# Patient Record
Sex: FEMALE | Race: WHITE | NOT HISPANIC OR LATINO | Employment: FULL TIME | URBAN - METROPOLITAN AREA
[De-identification: names, ages, dates, MRNs, and addresses within clinical notes are randomized per-mention and may not be internally consistent; named-entity substitution may affect disease eponyms.]

---

## 2023-11-21 ENCOUNTER — TELEMEDICINE (OUTPATIENT)
Dept: DERMATOLOGY | Facility: CLINIC | Age: 28
End: 2023-11-21
Payer: COMMERCIAL

## 2023-11-21 DIAGNOSIS — L70.0 ACNE VULGARIS: Primary | ICD-10-CM

## 2023-11-21 PROCEDURE — 99204 OFFICE O/P NEW MOD 45 MIN: CPT | Performed by: STUDENT IN AN ORGANIZED HEALTH CARE EDUCATION/TRAINING PROGRAM

## 2023-11-21 RX ORDER — SPIRONOLACTONE 100 MG/1
150 TABLET, FILM COATED ORAL DAILY
COMMUNITY
Start: 2023-06-14 | End: 2023-11-21 | Stop reason: SDUPTHER

## 2023-11-21 RX ORDER — SPIRONOLACTONE 100 MG/1
TABLET, FILM COATED ORAL
Qty: 45 TABLET | Refills: 11 | Status: SHIPPED | OUTPATIENT
Start: 2023-11-21

## 2023-11-21 RX ORDER — TERBINAFINE HYDROCHLORIDE 250 MG/1
250 TABLET ORAL DAILY
COMMUNITY
Start: 2023-10-13

## 2023-11-21 NOTE — PROGRESS NOTES
[facility] Note     Patient Name: Gila Fletcher  Encounter Date: 11/21/2023     Have you been cared for by a [facility]ologist in the last 3 years and, if so, which description applies to you? NO. I am considered a "new" patient and must complete all patient intake questions. I am FEMALE/of child-bearing potential.    REVIEW OF SYSTEMS:  Have you recently had or currently have any of the following? Recent fever or chills? No  Any non-healing wound? No  Are you pregnant or planning to become pregnant? No  Are you currently or planning to be nursing or breast feeding? No   PAST MEDICAL HISTORY:  Have you personally ever had or currently have any of the following? If "YES," then please provide more detail. Skin cancer (such as Melanoma, Basal Cell Carcinoma, Squamous Cell Carcinoma? No  Tuberculosis, HIV/AIDS, Hepatitis B or C: No  Radiation Treatment No   HISTORY OF IMMUNOSUPPRESSION:   Do you have a history of any of the following:  Systemic Immunosuppression such as Diabetes, Biologic or Immunotherapy, Chemotherapy, Organ Transplantation, Bone Marrow Transplantation? No    Answering "YES" requires the addition of the dotphrase "IMMUNOSUPPRESSED" as the first diagnosis of the patient's visit. FAMILY HISTORY:  Any "first degree relatives" (parent, brother, sister, or child) with the following? Skin Cancer, Pancreatic or Other Cancer? No   PATIENT EXPERIENCE:    Do you want the Dermatologist to perform a COMPLETE skin exam today including a clinical examination under the "bra and underwear" areas? NO  If necessary, do we have your permission to call and leave a detailed message on your Preferred Phone number that includes your specific medical information? Yes      Not on File No current outpatient medications on file. Whom besides the patient is providing clinical information about today's encounter?    NO ADDITIONAL HISTORIAN (patient alone provided history)    Physical Exam and Assessment/Plan by Diagnosis:    Virtual Regular Visit    Verification of patient location:    Patient is located at Home in the following state in which I hold an active license [place]      Assessment/Plan:    Problem List Items Addressed This Visit    None           Reason for visit is   Chief Complaint   Patient presents with    Virtual Regular Visit          Encounter provider Siva Diamond MD    Provider located at [address]  [phone]      Recent Visits  No visits were found meeting these conditions. Showing recent visits within past 7 days and meeting all other requirements  Today's Visits  Date Type Provider Dept   11/21/23 Telemedicine Siva Diamond MD  Dermatology [address] [facility]   Showing today's visits and meeting all other requirements  Future Appointments  No visits were found meeting these conditions. Showing future appointments within next 150 days and meeting all other requirements       The patient was identified by name and date of birth. Alfredo Corrales was informed that this is a telemedicine visit and that the visit is being conducted through the [employer]. She agrees to proceed. .  My office door was closed. The patient was notified the following individuals were present in the room [facility], [facility]. She acknowledged consent and understanding of privacy and security of the video platform. The patient has agreed to participate and understands they can discontinue the visit at any time. Patient is aware this is a billable service. Subjective  Alfredo Corrales is a 33 y.o. female for Acne. HPI     No past medical history on file. No past surgical history on file. No current outpatient medications on file. No current facility-administered medications for this visit.         Not on File    Review of Systems    Video Exam    There were no vitals filed for this visit. Physical Exam     Visit Time  Total Visit Duration: 15      ACNE VULGARIS ("COMMON ACNE")    Physical Exam:  Anatomic Location Affected:  Chin, jawline, back  Morphological Description: Clear today. Previously with cystic lesions  Pertinent Positives:  Pertinent Negatives: Additional History of Present Condition:  Present since 7years old on and off worse recently. She had cystic acne on shoulders and back and complained of hurting. Patient was taking Spironolactone 150 mg daily started 1 year ago and ran out of medication in the last week. Has an IUD. History notable for acne that responded well to spironolactone prescribed by outside provider  Denies family history of breast cancer, personal history of low blood pressure, liver disease, kidney disease, hyperkalemia  Educated that unless over age 39 or with a history of renal/liver disease, hyperkalemia, or medication interactions, evidence-based medicine does not support routine lab studies in patients on spironolactone. The potential link to estrogen-sensitive cancers in high dose animal studies such as breast cancer was discussed and the patient was counseled that the most recent meta analyses on spironolactone in humans on typical dosing has not demonstrated this risk. Discussed that treatment is directed at improving skin appearance and reducing the likelihood of scarring. Discussed theraputic ladder including topical OTC treatments, topical prescriptions, and oral medications. Discussed side effects as noted below. Plan today:  Follow up in 1 year  Continue spironolactone 150 mg PO DAILY. Educated to start with 50 mg nightly for two weeks then increase to BID dosing if tolerated. S/E reviewed including menstrual irregularity, breast tenderness, headaches, GI upset, K+ retention, palpitations, teratogenicity/feminization of male fetus. Handout provided.        Call with any questions or concerns before next follow-up visit; do not stop medications abruptly without consulting provider. Call our office at [phone] (SKIN). It is better to be safe than to be sorry!         Scribe Attestation      I,:  Derrell Maciel am acting as a scribe while in the presence of the attending physician.:       I,:  Clau Beasley MD personally performed the services described in this documentation    as scribed in my presence.:

## 2024-04-10 ENCOUNTER — PATIENT MESSAGE (OUTPATIENT)
Dept: DERMATOLOGY | Facility: CLINIC | Age: 29
End: 2024-04-10

## 2024-06-26 ENCOUNTER — OFFICE VISIT (OUTPATIENT)
Dept: DERMATOLOGY | Facility: CLINIC | Age: 29
End: 2024-06-26
Payer: COMMERCIAL

## 2024-06-26 ENCOUNTER — TELEPHONE (OUTPATIENT)
Age: 29
End: 2024-06-26

## 2024-06-26 VITALS — WEIGHT: 165 LBS | HEIGHT: 70 IN | TEMPERATURE: 97.2 F | BODY MASS INDEX: 23.62 KG/M2

## 2024-06-26 DIAGNOSIS — L60.3 ONYCHODYSTROPHY: Primary | ICD-10-CM

## 2024-06-26 PROCEDURE — 99214 OFFICE O/P EST MOD 30 MIN: CPT

## 2024-06-26 RX ORDER — CICLOPIROX 80 MG/ML
SOLUTION TOPICAL
Qty: 6.6 ML | Refills: 3 | Status: SHIPPED | OUTPATIENT
Start: 2024-06-26

## 2024-06-26 RX ORDER — CICLOPIROX OLAMINE 7.7 MG/100ML
SUSPENSION TOPICAL
Qty: 30 ML | Refills: 3 | Status: SHIPPED | OUTPATIENT
Start: 2024-06-26

## 2024-06-26 RX ORDER — PRENATAL VIT 91/IRON/FOLIC/DHA 28-975-200
COMBINATION PACKAGE (EA) ORAL 2 TIMES DAILY
Qty: 42 G | Refills: 5 | Status: CANCELLED | OUTPATIENT
Start: 2024-06-26

## 2024-06-26 NOTE — TELEPHONE ENCOUNTER
Call transferred from [facility] pharmacy calling to relay that potentially incorrect rx sent for ciclopirox. Pharmacist states nail lacquer rx concentration is 8% and bottle is 6.6ml. Noted will route to provider for review. No other questions at this time.

## 2024-06-26 NOTE — PROGRESS NOTES
"[facility] Note     Patient Name: Gloria Marquez  Encounter Date: 06/26/24     Have you been cared for by a [facility] Dermatologist in the last 3 years and, if so, which description applies to you?    Yes.  I have been here within the last 3 years, and my medical history has NOT changed since that time.  I am FEMALE/of child-bearing potential.    REVIEW OF SYSTEMS:  Have you recently had or currently have any of the following? No changes in my recent health.   PAST MEDICAL HISTORY:  Have you personally ever had or currently have any of the following?  If \"YES,\" then please provide more detail. No changes in my medical history.   HISTORY OF IMMUNOSUPPRESSION: Do you have a history of any of the following:  Systemic Immunosuppression such as Diabetes, Biologic or Immunotherapy, Chemotherapy, Organ Transplantation, Bone Marrow Transplantation?  No     Answering \"YES\" requires the addition of the dotphrase \"IMMUNOSUPPRESSED\" as the first diagnosis of the patient's visit.   FAMILY HISTORY:  Any \"first degree relatives\" (parent, brother, sister, or child) with the following?    No changes in my family's known health.   PATIENT EXPERIENCE:    Do you want the Dermatologist to perform a COMPLETE skin exam today including a clinical examination under the \"bra and underwear\" areas?  NO  If necessary, do we have your permission to call and leave a detailed message on your Preferred Phone number that includes your specific medical information?  Yes      No Known Allergies   Current Outpatient Medications:     spironolactone (ALDACTONE) 100 mg tablet, Take 1.5 tablets (150 mg) daily with large glass of water. STOP IMMEDIATELY IF YOU BECOME UNINTENTIONALLY PREGNANT or if you decide to plan for pregnancy. Avoid large amounts of high potassium food or beverages while taking this medication., Disp: 45 tablet, Rfl: 11    terbinafine (LamISIL) 250 mg tablet, 250 mg daily, Disp: , Rfl:           Whom besides the " "patient is providing clinical information about today's encounter?   NO ADDITIONAL HISTORIAN (patient alone provided history)    Physical Exam and Assessment/Plan by Diagnosis:      ONYCHODYSTROPHY-POSSIBLE ONYCHOMYCOSIS VS ONYCHODYSTROPHY SECONDARY TO MECHANICAL ABUTTING TRAUMA WITH TIGHT FITTING SHOES     Physical Exam:  Anatomic Location Affected:  left toe  Morphological Description:  faint yellow discoloration of toenail with horizontal ridges, thickening of toenail noted  Pertinent Positives:  Pertinent Negatives: no scaling in between toes or bottoms of feet     Additional History of Present Condition: Patient presents for an evaluation of toenail fungus. Patient states she has had this for a year. She saw urgent care for this and she was placed on a 3 month course of oral terbinafine. Patient noted improvement in the nail. Normal nail growth was seen. Patient denies any trauma to the nail or use of tight fitting shoes. Per photographs provided by patient, at the time nail appearance appeared consistent with fungal infection as nail was more discolored with a yellow/green color. Patient states the nail started to worsen again after completion of the oral terbinafine. She has been using a Khmer disinfection spray \"Octeni Sept\" and filing down nail.     Assessment and Plan:  Based on a thorough discussion of this condition and the management approach to it (including a comprehensive discussion of the known risks, side effects and potential benefits of treatment), the patient (family) agrees to implement the following specific plan:  Given lack of scaling in between toes or bottoms of feet, onychomycosis is less likely but cannot be sure for certain as toenail is very short and unable to clip a sample for fungal PCR.   Will treat with antifungals for now. Given risks and side effects of oral terbinafine, treatment with topical ciclopirox preferred.   Start ciclopirox solution-apply to affected nail once a day " in the evening. Recommend using alcohol pad every 7 days to remove buildup to allow for better penetration.    Can use topical antifungal as well such as over the counter Lotrimin Ultra twice a day Monday, Wednesday, Friday prevent potential spreading. Advised to apply in between toes and tops and bottoms of feet.   Let toenail grow out in meantime. Will take nail clipping for fungal PCR at follow-up.    Follow up in 3 months      Scribe Attestation      I,:  Radha Harris am acting as a scribe while in the presence of the attending physician.:       I,:  Aye Morgan PA-C personally performed the services described in this documentation    as scribed in my presence.:

## 2024-06-26 NOTE — TELEPHONE ENCOUNTER
Received call from [facility] pharmacy stating that the medication that was ordered is incorrect and needs to be changed.    I warm transferred the call to Reena to further assist her with the change

## 2024-09-18 ENCOUNTER — OFFICE VISIT (OUTPATIENT)
Dept: DERMATOLOGY | Facility: CLINIC | Age: 29
End: 2024-09-18
Payer: COMMERCIAL

## 2024-09-18 VITALS — WEIGHT: 168 LBS | HEIGHT: 70 IN | BODY MASS INDEX: 24.05 KG/M2

## 2024-09-18 DIAGNOSIS — L03.032 PARONYCHIA OF GREAT TOE, LEFT: Primary | ICD-10-CM

## 2024-09-18 DIAGNOSIS — L60.3 ONYCHODYSTROPHY: ICD-10-CM

## 2024-09-18 PROCEDURE — 99214 OFFICE O/P EST MOD 30 MIN: CPT

## 2024-09-18 RX ORDER — MUPIROCIN 20 MG/G
OINTMENT TOPICAL
Qty: 30 G | Refills: 0 | Status: SHIPPED | OUTPATIENT
Start: 2024-09-18

## 2024-09-18 RX ORDER — CEPHALEXIN 500 MG/1
500 CAPSULE ORAL EVERY 12 HOURS SCHEDULED
Qty: 14 CAPSULE | Refills: 0 | Status: SHIPPED | OUTPATIENT
Start: 2024-09-18 | End: 2024-09-25

## 2024-09-18 RX ORDER — CLOBETASOL PROPIONATE 0.5 MG/G
OINTMENT TOPICAL
Qty: 30 G | Refills: 0 | Status: SHIPPED | OUTPATIENT
Start: 2024-09-18

## 2024-09-18 NOTE — PATIENT INSTRUCTIONS
1. The umbilical cord will fall off on its own around 10 days, until then only sponge baths.  2. No water, tea, juice, or solids for 6 months. No honey or cow’s milk for 1 year.  3. Always place your baby on his/her back to sleep (not his/her tummy). Have the baby sleep only in his/her crib or bassinet. Don’t have any extra blankets, pillows, or stuffed animals in the crib with them. Never have the baby sleep in your bed with you!  4. Take to ER immediately with rectal temperature 100.4 F or above. If you don't have a thermometer at home, buy one soon.  5. Use a rear-facing car seat, always in the back seat.  6. When you get frustrated at your baby, never shake him/her. It is okay to place the baby in the crib and let him/her cry for a few minutes while you calm down or get someone else to help. Feeling tired, blue, or overwhelmed in the first weeks is normal. If it continues, resources are available for help.  7. We recommend that you give daily Vitamin D supplement to your baby, since he/she won't get enough from breast milk or formula. There are many brands available over-the- counter. See the label for instructions on how much to give.  8. Use fragrance-free soap/lotion like aveeno or aquafor. Do not use any baby powders. Avoid direct sunlight. Change diapers frequently to avoid diaper rash. Wash your hands often and keep the baby away from people who are sick.  9. Ok to express milk manually or pump to the point of comfort (2-3 min/breast) to help with the engorged feeling. Also, it may help to apply warm compresses before feeding, and cold compresses between feedings.     Assessment and Plan:  Based on a thorough discussion of this condition and the management approach to it (including a comprehensive discussion of the known risks, side effects and potential benefits of treatment), the patient (family) agrees to implement the following specific plan:  Given lack of scaling in between toes or bottoms of feet, onychomycosis is less likely but cannot be sure for certain as toenail is very short and unable to clip a sample for fungal PCR.    Let toenail grow out in meantime.   Start Mupirocin for 10 days to affected left toe  After the ten days start Clobetasol for 2 weeks  Start taking Keflex 500 mg twice a day for 7 days

## 2024-09-18 NOTE — PROGRESS NOTES
"[facility] Note     Patient Name: Gloria Marquez  Encounter Date: 9/18/24     Have you been cared for by a [facility] Dermatologist in the last 3 years and, if so, which description applies to you?    Yes.  I have been here within the last 3 years, and my medical history has NOT changed since that time.  I am FEMALE/of child-bearing potential.    REVIEW OF SYSTEMS:  Have you recently had or currently have any of the following? No changes in my recent health.   PAST MEDICAL HISTORY:  Have you personally ever had or currently have any of the following?  If \"YES,\" then please provide more detail. No changes in my medical history.   HISTORY OF IMMUNOSUPPRESSION: Do you have a history of any of the following:  Systemic Immunosuppression such as Diabetes, Biologic or Immunotherapy, Chemotherapy, Organ Transplantation, Bone Marrow Transplantation or Prednisone?  No     Answering \"YES\" requires the addition of the dotphrase \"IMMUNOSUPPRESSED\" as the first diagnosis of the patient's visit.   FAMILY HISTORY:  Any \"first degree relatives\" (parent, brother, sister, or child) with the following?    No changes in my family's known health.   PATIENT EXPERIENCE:    Do you want the Dermatologist to perform a COMPLETE skin exam today including a clinical examination under the \"bra and underwear\" areas?  NO  If necessary, do we have your permission to call and leave a detailed message on your Preferred Phone number that includes your specific medical information?  Yes      No Known Allergies   Current Outpatient Medications:     ciclopirox (LOPROX) 0.77 % SUSP, Apply to affected toenail once a day in evenings. Remove buildup with alcohol pad every 7 days., Disp: 30 mL, Rfl: 3    ciclopirox (PENLAC) 8 % solution, Apply to affected toenail once a day in evenings. Remove buildup with alcohol pad every 7 days., Disp: 6.6 mL, Rfl: 3    spironolactone (ALDACTONE) 100 mg tablet, Take 1.5 tablets (150 mg) daily with " large glass of water. STOP IMMEDIATELY IF YOU BECOME UNINTENTIONALLY PREGNANT or if you decide to plan for pregnancy. Avoid large amounts of high potassium food or beverages while taking this medication., Disp: 45 tablet, Rfl: 11    terbinafine (LamISIL) 250 mg tablet, 250 mg daily, Disp: , Rfl:           Whom besides the patient is providing clinical information about today's encounter?   NO ADDITIONAL HISTORIAN (patient alone provided history)    Physical Exam and Assessment/Plan by Diagnosis:     ACUTE PARONYCHIA/ONYCHODYSTROPHY   Physical Exam:  Anatomic Location Affected: proximal, lateral and distal nail folds of left big toe  Morphological Description: erythema and edema of nail folds with tenderness on palpation, yellow discoloration of toenail with horizontal ridges, thickening of toenail   Pertinent Negatives: no scaling in between toes or bottoms of feet      Additional History of Present Condition: Patient presents as a follow-up for onychodystrophy that could possibly have been due to onychomycosis versus trauma from tight fitting shoes. Onychomycosis was less likely due to lack of scaling of feet and in between toes, but treated with antifungals in the meantime until nail grew out long enough to clip and send for fungal culture.Nail clipping could not be performed at that time as nail was very short. Patient advised to follow up for nail clipping once nail has grown out. Patient was prescribed topical Ciclopirox to apply to nail in addition to over the counter Lotrimin Ultra to the feet which the patient did not try. Patient noted some improvement but today the toes was inflamed. Patient denies any trauma to the nail or use of tight fitting shoes.      Assessment and Plan:  Based on a thorough discussion of this condition and the management approach to it (including a comprehensive discussion of the known risks, side effects and potential benefits of treatment), the patient (family) agrees to implement  the following specific plan:  Nail remains too short for nail clipping at this time.  Recommend stopping Ciclopirox solution at this time given acute paronychia.   Discuss paronychia is usually bacterial will treat with antibacterial agents.   Start Mupirocin 2% ointment -apply to nail folds around nail twice a day for 10 days.  After the ten days can then start Clobetasol 0.05% cream- apply twice a day to nail folds around nail for 2 weeks only.   Start Keflex 500 mg twice a day for 7 days.  Follow-up: 1 month.    Scribe Attestation      I,:  Beverley Olvera MA am acting as a scribe while in the presence of the attending physician.:       I,:  Aye Morgan PA-C personally performed the services described in this documentation    as scribed in my presence.:

## 2024-10-01 ENCOUNTER — TELEPHONE (OUTPATIENT)
Dept: DERMATOLOGY | Facility: CLINIC | Age: 29
End: 2024-10-01

## 2024-10-01 NOTE — TELEPHONE ENCOUNTER
LMOM that 11/20/24 appt w/Aye was cx & rs, due to a change in her sched, please call the office to confirm or r/s to another date

## 2024-11-20 ENCOUNTER — OFFICE VISIT (OUTPATIENT)
Age: 29
End: 2024-11-20
Payer: COMMERCIAL

## 2024-11-20 VITALS — TEMPERATURE: 97.6 F

## 2024-11-20 DIAGNOSIS — R23.8 ERYTHEMATOUS PAPULES OF SKIN: ICD-10-CM

## 2024-11-20 DIAGNOSIS — L70.0 ACNE VULGARIS: ICD-10-CM

## 2024-11-20 DIAGNOSIS — L60.3 ONYCHODYSTROPHY: Primary | ICD-10-CM

## 2024-11-20 PROCEDURE — 99214 OFFICE O/P EST MOD 30 MIN: CPT

## 2024-11-20 RX ORDER — TRETINOIN 0.25 MG/G
CREAM TOPICAL
Qty: 45 G | Refills: 4 | Status: SHIPPED | OUTPATIENT
Start: 2024-11-20

## 2024-11-20 RX ORDER — SPIRONOLACTONE 100 MG/1
TABLET, FILM COATED ORAL
Qty: 45 TABLET | Refills: 5 | Status: SHIPPED | OUTPATIENT
Start: 2024-11-20

## 2024-11-20 NOTE — PROGRESS NOTES
"[facility] Note     Patient Name: Gloria Marquez  Encounter Date: 11/20/24     Have you been cared for by a [facility] Dermatologist in the last 3 years and, if so, which description applies to you?    Yes.  I have been here within the last 3 years, and my medical history has NOT changed since that time.  I am FEMALE/of child-bearing potential.    REVIEW OF SYSTEMS:  Have you recently had or currently have any of the following? No changes in my recent health.   PAST MEDICAL HISTORY:  Have you personally ever had or currently have any of the following?  If \"YES,\" then please provide more detail. No changes in my medical history.   HISTORY OF IMMUNOSUPPRESSION: Do you have a history of any of the following:  Systemic Immunosuppression such as Diabetes, Biologic or Immunotherapy, Chemotherapy, Organ Transplantation, Bone Marrow Transplantation or Prednisone?  No     Answering \"YES\" requires the addition of the dotphrase \"IMMUNOSUPPRESSED\" as the first diagnosis of the patient's visit.   FAMILY HISTORY:  Any \"first degree relatives\" (parent, brother, sister, or child) with the following?    No changes in my family's known health.   PATIENT EXPERIENCE:    Do you want the Dermatologist to perform a COMPLETE skin exam today including a clinical examination under the \"bra and underwear\" areas?  NO  If necessary, do we have your permission to call and leave a detailed message on your Preferred Phone number that includes your specific medical information?  Yes      No Known Allergies   Current Outpatient Medications:     ciclopirox (LOPROX) 0.77 % SUSP, Apply to affected toenail once a day in evenings. Remove buildup with alcohol pad every 7 days. (Patient not taking: Reported on 9/18/2024), Disp: 30 mL, Rfl: 3    ciclopirox (PENLAC) 8 % solution, Apply to affected toenail once a day in evenings. Remove buildup with alcohol pad every 7 days., Disp: 6.6 mL, Rfl: 3    clobetasol (TEMOVATE) 0.05 % " ointment, After 10 days of mupirocin,  apply this twice a day to nail folds around nail for 2 weeks only, Disp: 30 g, Rfl: 0    mupirocin (BACTROBAN) 2 % ointment, Apply to nail folds around nail twice a day for 10 days., Disp: 30 g, Rfl: 0    spironolactone (ALDACTONE) 100 mg tablet, Take 1.5 tablets (150 mg) daily with large glass of water. STOP IMMEDIATELY IF YOU BECOME UNINTENTIONALLY PREGNANT or if you decide to plan for pregnancy. Avoid large amounts of high potassium food or beverages while taking this medication., Disp: 45 tablet, Rfl: 11    terbinafine (LamISIL) 250 mg tablet, 250 mg daily, Disp: , Rfl:           Whom besides the patient is providing clinical information about today's encounter?   NO ADDITIONAL HISTORIAN (patient alone provided history)    Physical Exam and Assessment/Plan by Diagnosis:    ONYCHODYSTROPHY/ ACUTE PARONYCHIA- RESOLVED     Physical Exam:  Anatomic Location Affected: left big toe  Morphological Description:  yellow discoloration of toenail with horizontal ridges, thickening of toenail, erythema and edema of nail folds or tenderness on palpation.   Pertinent Negatives: no scaling in between toes or bottoms of feet      Additional History of Present Condition: Patient presents as a follow-up for onychodystrophy and acute paronychia. She completed her course of Keflex 500 mg BID for one week. She used the mupirocin ointment BID for 10 days and clobetasol 0.05% cream BID for 2 weeks; she reports that these topicals have helped her condition. She present today for a nail clipping to rule out onychomycosis.      Assessment and Plan:  Based on a thorough discussion of this condition and the management approach to it (including a comprehensive discussion of the known risks, side effects and potential benefits of treatment), the patient (family) agrees to implement the following specific plan:  Acute paronychia has resolved.   Onychodystrophy remains. Unable to perform nail clipping for  fungal culture today as clippers not present in the office. They were ordered and will arrive later this week.   Follow-up: Monday 11/25/ 2024 in [place] for nail clipping.   Recommend patient avoid tight fitting shoes.       ACNE VULGARIS    Physical Exam:  Anatomic Location Affected:  nose   Morphological Description: open comedones on nose       Additional History of Present Condition:  Patient would like a refill on her acne medications. She notes her acne has significantly improved. She is currently on oral spironolactone 150mg daily. She is tolerating this well. She has an IUD in place and is not planning on conceiving any time soon. She is additionally using combination topical clindamycin and tretinoin. This was not prescribed by [facility]. She reports before she use to have deep cystic inflammatory acne. Her skin has significantly improved on the spironolactone and topical clindamycin and tretinoin.     Plan today:     -AM:Gentle cleanser, such as CeraVe, Cetaphil or La Roche Posay  - SPF 30 or greater (can be a lotion with SPF like CeraVe AM)/non-comedogenic moisturizer such as CeraVe, Cetaphil or Vanicream.     PM:  - Gentle cleanser, such as CeraVe, Cetaphil or La Roche Posay  - Start Tretinoin 0.025% cream- Apply a pea-sized amount evenly to the entire face one hour before bedtime. Start by applying every other night, advance to nightly as tolerated. Avoid eyes and mouth.  - non-comedogenic moisturizer such as CeraVe, Cetaphil or Vanicream. Can be used on top of retinoid.  - Continue oral spironolactone 150mg daily. S/E reviewed including menstrual irregularity, breast tenderness, headaches, GI upset, K+ retention, palpitations, teratogenicity/feminization of male fetus     Follow up in 6 months for acne.     Erythematous Papule   Physical Exam:  Anatomic Location Affected:  central lower lip   Morphological Description:  pink papule, no ulceration, erosion, or blistering. No  concerning findings on dermoscopy.     Additional History of Present Condition:  Patient notes it appeared today. She denies any burning or tingling sensation to the area. She denies any trauma to lip prior to it appearing.     Assessment and Plan:  Based on a thorough discussion of this condition and the management approach to it (including a comprehensive discussion of the known risks, side effects and potential benefits of treatment), the patient (family) agrees to implement the following specific plan:  Recommend patient monitor. If no resolution in 1 week, recommend patient follow up for further evaluation.       Scribe Attestation      I,:  Nina Juares MA am acting as a scribe while in the presence of the attending physician.:       I,:  Aye Morgan PA-C personally performed the services described in this documentation    as scribed in my presence.:

## 2024-11-25 ENCOUNTER — OFFICE VISIT (OUTPATIENT)
Dept: DERMATOLOGY | Facility: CLINIC | Age: 29
End: 2024-11-25
Payer: COMMERCIAL

## 2024-11-25 VITALS — BODY MASS INDEX: 24.05 KG/M2 | TEMPERATURE: 98 F | HEIGHT: 70 IN | WEIGHT: 168 LBS

## 2024-11-25 DIAGNOSIS — L60.3 ONYCHODYSTROPHY: ICD-10-CM

## 2024-11-25 DIAGNOSIS — B35.1 ONYCHOMYCOSIS: Primary | ICD-10-CM

## 2024-11-25 PROCEDURE — 88304 TISSUE EXAM BY PATHOLOGIST: CPT | Performed by: PATHOLOGY

## 2024-11-25 PROCEDURE — 99214 OFFICE O/P EST MOD 30 MIN: CPT

## 2024-11-25 PROCEDURE — 11755 BIOPSY NAIL UNIT: CPT

## 2024-11-25 PROCEDURE — 87102 FUNGUS ISOLATION CULTURE: CPT

## 2024-11-25 PROCEDURE — 88312 SPECIAL STAINS GROUP 1: CPT | Performed by: PATHOLOGY

## 2024-11-25 NOTE — PROGRESS NOTES
"[facility] D[facility]atology Clinic Note     Patient Name: Gloria Marquez  Encounter Date: 11/25/24     Have you been cared for by a [facility] Dermatologist in the last 3 years and, if so, which description applies to you?    Yes.  I have been here within the last 3 years, and my medical history has NOT changed since that time.  I am FEMALE/of child-bearing potential.    REVIEW OF SYSTEMS:  Have you recently had or currently have any of the following? No changes in my recent health.   PAST MEDICAL HISTORY:  Have you personally ever had or currently have any of the following?  If \"YES,\" then please provide more detail. No changes in my medical history.   HISTORY OF IMMUNOSUPPRESSION: Do you have a history of any of the following:  Systemic Immunosuppression such as Diabetes, Biologic or Immunotherapy, Chemotherapy, Organ Transplantation, Bone Marrow Transplantation or Prednisone?  No     Answering \"YES\" requires the addition of the dotphrase \"IMMUNOSUPPRESSED\" as the first diagnosis of the patient's visit.   FAMILY HISTORY:  Any \"first degree relatives\" (parent, brother, sister, or child) with the following?    No changes in my family's known health.   PATIENT EXPERIENCE:    Do you want the Dermatologist to perform a COMPLETE skin exam today including a clinical examination under the \"bra and underwear\" areas?  NO  If necessary, do we have your permission to call and leave a detailed message on your Preferred Phone number that includes your specific medical information?  Yes      No Known Allergies   Current Outpatient Medications:     spironolactone (ALDACTONE) 100 mg tablet, Take 1.5 tablets (150 mg) daily with large glass of water. STOP IMMEDIATELY IF YOU BECOME UNINTENTIONALLY PREGNANT or if you decide to plan for pregnancy. Avoid large amounts of high potassium food or beverages while taking this medication., Disp: 45 tablet, Rfl: 5    tretinoin (RETIN-A) 0.025 % cream, Apply a pea-sized amount evenly to the " entire face one hour before bedtime. Start by applying every other night, advance to nightly as tolerated. Avoid eyes and mouth., Disp: 45 g, Rfl: 4    ciclopirox (LOPROX) 0.77 % SUSP, Apply to affected toenail once a day in evenings. Remove buildup with alcohol pad every 7 days. (Patient not taking: Reported on 9/18/2024), Disp: 30 mL, Rfl: 3    ciclopirox (PENLAC) 8 % solution, Apply to affected toenail once a day in evenings. Remove buildup with alcohol pad every 7 days. (Patient not taking: Reported on 11/25/2024), Disp: 6.6 mL, Rfl: 3    clobetasol (TEMOVATE) 0.05 % ointment, After 10 days of mupirocin,  apply this twice a day to nail folds around nail for 2 weeks only, Disp: 30 g, Rfl: 0    mupirocin (BACTROBAN) 2 % ointment, Apply to nail folds around nail twice a day for 10 days., Disp: 30 g, Rfl: 0    terbinafine (LamISIL) 250 mg tablet, 250 mg daily, Disp: , Rfl:           Whom besides the patient is providing clinical information about today's encounter?   NO ADDITIONAL HISTORIAN (patient alone provided history)    Physical Exam and Assessment/Plan by Diagnosis:    ONYCHODYSTROPHY- POSSIBLE ONYCHOMYCOSIS VS ONYCHODYSTROPHY SECONDARY TO MECHANICAL TOE ABUTTING TRAUMA FROM TIGHT FITTING SHOES      Physical Exam:  Anatomic Location Affected: left great toenail   Morphological Description:  yellow discoloration of toenail with horizontal ridges, thickening of toenail  Pertinent Negatives: no scaling in between toes or bottoms of feet      Additional History of Present Condition: Patient presents as a follow-up for onychodystrophy and to have toenail clipping performed for fungal culture to rule out onychomycosis. Patients nail has grown long enough to perform nail clipping. Patient has used topical ciclopirox and a 3 month oral course of terbinafine in the past.     Assessment and Plan:  Based on a thorough discussion of this condition and the management approach to it (including a comprehensive discussion of  the known risks, side effects and potential benefits of treatment), the patient (family) agrees to implement the following specific plan:  Nail clipping performed on left great toenail today. Verbal consent obtained.   Sample collected for PAS staining and submitted in formalin (Tissue order)   Sample of nail clipping with subungual debris collected for fungal culture and submitted in sterile container (Wound Culture and Gram Stain)  Will call patient with results.   May consider referral for podiatry.         No

## 2024-11-26 ENCOUNTER — TELEPHONE (OUTPATIENT)
Age: 29
End: 2024-11-26

## 2024-11-26 DIAGNOSIS — B35.1 ONYCHOMYCOSIS: Primary | ICD-10-CM

## 2024-11-26 DIAGNOSIS — L60.3 ONYCHODYSTROPHY: ICD-10-CM

## 2024-11-26 NOTE — TELEPHONE ENCOUNTER
Received call from Mercedes [facility].    Mercedes was asking for clarification if the toenail sample that was sent over for labs should be a fungal order instead of a wound culture, and was wondering if the lab order was coded incorrectly.     [facility]: Please call Mercedes  [facility] [phone] and let her know if this lab order is supposed to be a fungal culture.

## 2024-11-26 NOTE — ADDENDUM NOTE
Addended by: CHARLEY HERNANDEZ on: 11/26/2024 02:31 PM     Modules accepted: Orders, Level of Service

## 2024-12-02 LAB — FUNGUS SPEC CULT: NORMAL

## 2024-12-09 ENCOUNTER — RESULTS FOLLOW-UP (OUTPATIENT)
Dept: DERMATOLOGY | Facility: CLINIC | Age: 29
End: 2024-12-09

## 2024-12-09 DIAGNOSIS — L60.3 ONYCHODYSTROPHY: Primary | ICD-10-CM

## 2024-12-09 LAB — FUNGUS SPEC CULT: NORMAL

## 2024-12-09 NOTE — RESULT ENCOUNTER NOTE
Results reviewed. Tissue results demonstrated possible onychomycosis. Per note:  PAS stain shows rare foci suggestive of fungi.     Further, fungal culture still pending. So far preliminary result did not show any growth.     Called patient and left detailed message informing her of results thus far. Advised fungal culture is still pending. Will follow-up once results finalized.

## 2024-12-16 LAB — FUNGUS SPEC CULT: NORMAL

## 2024-12-17 ENCOUNTER — OFFICE VISIT (OUTPATIENT)
Dept: OBGYN CLINIC | Facility: MEDICAL CENTER | Age: 29
End: 2024-12-17
Payer: COMMERCIAL

## 2024-12-17 VITALS
WEIGHT: 181.3 LBS | DIASTOLIC BLOOD PRESSURE: 68 MMHG | HEIGHT: 70 IN | BODY MASS INDEX: 25.96 KG/M2 | SYSTOLIC BLOOD PRESSURE: 105 MMHG

## 2024-12-17 DIAGNOSIS — Z20.2 POSSIBLE EXPOSURE TO STD: ICD-10-CM

## 2024-12-17 DIAGNOSIS — Z01.419 ENCOUNTER FOR WELL WOMAN EXAM WITH ROUTINE GYNECOLOGICAL EXAM: Primary | ICD-10-CM

## 2024-12-17 DIAGNOSIS — R10.2 PELVIC PAIN IN FEMALE: ICD-10-CM

## 2024-12-17 DIAGNOSIS — Z23 NEED FOR HPV VACCINATION: ICD-10-CM

## 2024-12-17 PROCEDURE — 87491 CHLMYD TRACH DNA AMP PROBE: CPT | Performed by: OBSTETRICS & GYNECOLOGY

## 2024-12-17 PROCEDURE — 99385 PREV VISIT NEW AGE 18-39: CPT | Performed by: OBSTETRICS & GYNECOLOGY

## 2024-12-17 PROCEDURE — G0145 SCR C/V CYTO,THINLAYER,RESCR: HCPCS | Performed by: OBSTETRICS & GYNECOLOGY

## 2024-12-17 PROCEDURE — 90651 9VHPV VACCINE 2/3 DOSE IM: CPT | Performed by: OBSTETRICS & GYNECOLOGY

## 2024-12-17 PROCEDURE — 87591 N.GONORRHOEAE DNA AMP PROB: CPT | Performed by: OBSTETRICS & GYNECOLOGY

## 2024-12-17 PROCEDURE — 99213 OFFICE O/P EST LOW 20 MIN: CPT | Performed by: OBSTETRICS & GYNECOLOGY

## 2024-12-17 PROCEDURE — G0476 HPV COMBO ASSAY CA SCREEN: HCPCS | Performed by: OBSTETRICS & GYNECOLOGY

## 2024-12-17 PROCEDURE — 90471 IMMUNIZATION ADMIN: CPT | Performed by: OBSTETRICS & GYNECOLOGY

## 2024-12-17 RX ORDER — OMEGA-3S/DHA/EPA/FISH OIL/D3 300MG-1000
400 CAPSULE ORAL DAILY
COMMUNITY

## 2024-12-17 RX ORDER — MULTIVITAMIN
1 TABLET ORAL DAILY
COMMUNITY

## 2024-12-17 NOTE — PATIENT INSTRUCTIONS
"Patient Education     HPV (Human Papillomavirus) Vaccine CDC Vaccine Information Statement (VIS)   About this topic       Patient Education     Hysterectomy   The Basics   Written by the doctors and editors at UpToDate   What is a hysterectomy? -- A hysterectomy is surgery to remove the uterus (figure 1). The uterus is the part of your body that carries a baby if you are pregnant. After a hysterectomy, you will not be able to get pregnant.  Ask the doctor if they plan to remove your cervix, ovaries, or fallopian tubes along with your uterus. This is important because you might need different medical care depending on which parts are removed (figure 2).  A hysterectomy can be done in 3 ways:   Open surgery - During an open surgery, the doctor makes a cut, or \"incision,\" in the belly to remove the uterus.   Minimally invasive surgery - \"Minimally invasive\" surgery lets the doctor make smaller cuts in the belly. They insert long, thin tools through the cuts. One of the tools has a camera (called a \"laparoscope\") on the end, which sends pictures to a TV screen. The doctor can look at the screen to see inside the belly. Then, they use the long tools to do the surgery. They can control the tools directly, or with the help of a robot (this is called \"robot-assisted\" surgery).   Vaginal surgery - For this type of surgery, the doctor makes a cut in the vagina to remove the uterus.  You might be able to return to normal activities sooner if you had minimally invasive surgery or vaginal surgery.  How do I prepare for a hysterectomy? -- The doctor or nurse will tell you if you need to do anything special to prepare.  Before your procedure, your doctor will do an exam. They might order tests, such as:   Lab tests   Ultrasound  Your doctor will also ask you about your \"health history.\" This involves asking you questions about any health problems you have or had in the past, past surgeries, and any medicines you take. Tell them " "about:   Any medicines you are taking - This includes any prescription or \"over-the-counter\" medicines you use, plus any herbal supplements you take. It helps to write down and bring a list of any medicines you take, or bring a bag with all of your medicines with you.   Any allergies you have   Any bleeding problems you have - Certain medicines, including some herbs and supplements, can increase the risk of bleeding. Some health conditions also increase this risk.  You will also get information about:   Eating and drinking before your procedure - In some cases, you might need to \"fast\" before surgery. This means not eating or drinking anything for a period of time. In other cases, you might be allowed to have liquids until a short time before the procedure. Whether you need to fast, and for how long, depends on the procedure you are having.   Lowering the risk of infection - In some cases, you might want to trim (not shave) your body hair before your procedure. You might also need to wash the area with a special soap.   What you will need when you go home - For example, you might need to have someone else bring you home or stay with you for some time while you recover.  What happens during a hysterectomy? -- When it is time for the procedure:   You will get an \"IV,\" which is a thin tube that goes into a vein. This can be used to give you fluids and medicines.   You will get anesthesia medicines. This is to make sure that you do not feel pain during the procedure. Types of anesthesia include:   Regional - This type of anesthesia blocks pain in 1 area of your body, such as an arm, a leg, or the lower half of your body. If you get regional anesthesia, you might be awake. Or you might get medicines to make you relax and feel sleepy, called \"sedatives.\"   General - This type of anesthesia makes you unconscious so you can't feel, see, or hear anything during the procedure. If you have general anesthesia, you might get a " "breathing tube to help you breathe.   You might get medicines to help control pain after the procedure.   The doctor will put a thin, flexible tube called a \"catheter\" into your bladder. This is to drain urine during the procedure.   The doctors and nurses will monitor your breathing, blood pressure, and heart rate during the procedure.   The doctor will take out your uterus. The doctor might also remove your cervix, ovaries, and fallopian tubes if needed.   The doctor will close your incisions and cover them with clean bandages.   The surgery usually takes 2 to 3 hours.  What happens after a hysterectomy? -- After your procedure, you will be taken to a recovery room. The staff will watch you closely as your anesthesia wears off. You might have to stay in the hospital for 1 to 3 days. Sometimes, you can go home the same day.  As you recover:   You might feel groggy or confused for a short time. The doctor or nurse can give you medicine to help with this.   If you had a catheter in your bladder, it will be removed.   If you had a breathing tube, you might have a sore throat. This usually gets better quickly.   The staff will help you get out of bed and start moving around when you are ready.   You will get medicine if needed to help with pain. You might need other medicines, too.   When you are ready to eat, you will start with clear liquids. Then, you can start eating as you are able. You might feel better if you start with bland foods.   Some people feel a sense of sadness or loss after a hysterectomy. Talk to your doctor if the feeling lasts for weeks after the procedure.  What are the risks of hysterectomy? -- Your doctor will talk to you about all of the possible risks, and answer your questions. Possible risks include:   Infection   Bleeding   The wound opening up   Blood clots in your legs or lungs   Injury to nearby organs  What else should I know? -- It is normal to have some light vaginal bleeding or " "spotting after a hysterectomy. The drainage can be pink to brown or yellow colored and can last for a few weeks.  After a hysterectomy, you will not have a monthly period. It is not possible to get pregnant after your uterus is removed.  If your ovaries were also removed, you will start to go through menopause if you have not already. Symptoms of menopause can include:   Hot flashes or night sweats   Low mood   Vaginal dryness   Trouble falling or staying asleep   Trouble concentrating or remembering things   Weak bones  There are treatments that can help relieve menopause symptoms.  All topics are updated as new evidence becomes available and our peer review process is complete.  This topic retrieved from [employer] on: Apr 11, 2024.  Topic 271115 Version 2.0  Release: 32.3.2 - C32.100  © 2024 UpToDate, Inc. and/or its affiliates. All rights reserved.  figure 1: Female reproductive anatomy     The internal organs that make up the female reproductive system are located in the lower belly. These include the uterus, fallopian tubes, ovaries, cervix, and vagina.  The uterus has an inner lining, called the \"endometrium,\" and a thicker outer layer, called the \"myometrium.\"  Graphic 32340 Version 8.0  figure 2: Types of hysterectomy     These are examples of different types of a hysterectomy. With a total hysterectomy, your uterus and cervix are removed. With a subtotal hysterectomy, your uterus is removed, but your cervix remains.  In a \"total hysterectomy,\" the doctor removes the uterus and cervix. If it is a \"subtotal\" or \"supracervical\" hysterectomy, the doctor removes the uterus but leaves the cervix in place. With a \"total hysterectomy with salpingo-oopherectomy,\" the doctor also removes your ovaries and fallopian tubes, in addition to your uterus and cervix.  Graphic 273534 Version 1.0  Consumer Information Use and Disclaimer   Disclaimer: This generalized information is a limited summary of diagnosis, treatment, " and/or medication information. It is not meant to be comprehensive and should be used as a tool to help the user understand and/or assess potential diagnostic and treatment options. It does NOT include all information about conditions, treatments, medications, side effects, or risks that may apply to a specific patient. It is not intended to be medical advice or a substitute for the medical advice, diagnosis, or treatment of a health care provider based on the health care provider's examination and assessment of a patient's specific and unique circumstances. Patients must speak with a health care provider for complete information about their health, medical questions, and treatment options, including any risks or benefits regarding use of medications. This information does not endorse any treatments or medications as safe, effective, or approved for treating a specific patient. UpToDate, Inc. and its affiliates disclaim any warranty or liability relating to this information or the use thereof.The use of this information is governed by the Terms of Use, available at https://www.[employer].com/en/know/clinical-effectiveness-terms. 2024© UpToDate, Inc. and its affiliates and/or licensors. All rights reserved.  Copyright   © 2024 UpToDate, Inc. and/or its affiliates. All rights reserved.

## 2024-12-17 NOTE — PROGRESS NOTES
ASSESSMENT & PLAN: Diagnoses and all orders for this visit:    Encounter for well woman exam with routine gynecological exam  -     Cancel: Liquid-based pap, screening  -     Liquid-based pap, screening    Pelvic pain in female  -     US pelvis complete non OB; Future    Possible exposure to STD  -     Chlamydia/GC amplified DNA by PCR    Other orders  -     Levonorgestrel (MIRENA) 20 MCG/DAY IUD; 1 each by Intrauterine Device route Once every 8 years  -     cholecalciferol (VITAMIN D3) 400 units tablet; Take 400 Units by mouth daily  -     Multiple Vitamin (multivitamin) tablet; Take 1 tablet by mouth daily         1.  Routine well woman exam done today  2.  Pap:  The patient's pap is not up to date.  Pap was done today.  Current ASCCP Guidelines reviewed.  3.  STD testing was done.  Gonorrhea and Chlamydia to be run off Pap  4.  Patient has not had her Gardasil vaccination.  Recommendations reviewed.  Patient received Gardasil #1 today.  5. The following were reviewed in today's visit: adequate intake of calcium and vitamin D, exercise, and healthy diet.  6.  F/u in 1 year for next routine GYN exam.  7.  Pelvic pain:   Patient given order for pelvic ultrasound.  She will return for consultation to discuss surgical options.    CC:  Annual Gynecologic Examination    HPI: Gloria Marquez is a 29 y.o. G0 who presents for annual gynecologic examination.  She has the following concerns: Patient reports longstanding history of pelvic pain and pelvic cramping.  Had a Mirena IUD in 2019 which helps her symptoms.  However in the past 6 months her pelvic pain and cramping has returned.  Patient reports pain is usually midline and to the right.  Wants to discuss hysterectomy.  Patient has lose of urine with sneezing.  Has moved here from [place].      Health Maintenance:    Patient describes her health as good.  The last health maintenance visit was 2 years ago.  Patient does have weight concerns.  She exercises 5 days  per week with lifting, getting and cardio.    She does wear her seatbelt routinely.    She does perform regular monthly self breast exams.    She does feels safe at home.   Patients does follow a vegetarian diet.  Patients gets 1 servings of dairy or calcium rich foods a day.    Last pap:  unsure       There is no problem list on file for this patient.      History reviewed. No pertinent past medical history.    History reviewed. No pertinent surgical history.    Past OB/Gyn History:  Pt does not have menstrual issues. Has Mirena IUD, placed 9/2019  History of sexually transmitted infection: No.  History of abnormal pap smears: Yes, abnormal 2019 & 2020 abnormal, normal in 2021 & 2022.    Patient is not currently sexually active.  heterosexual. The current method of family planning is IUD.    History reviewed. No pertinent family history.    Social History:  Social History     Socioeconomic History    Marital status: Single     Spouse name: Not on file    Number of children: Not on file    Years of education: Not on file    Highest education level: Not on file   Occupational History    Not on file   Tobacco Use    Smoking status: Never     Passive exposure: Never    Smokeless tobacco: Never   Vaping Use    Vaping status: Never Used   Substance and Sexual Activity    Alcohol use: Not Currently    Drug use: Not Currently    Sexual activity: Not Currently   Other Topics Concern    Not on file   Social History Narrative    Not on file     Social Drivers of Health     Financial Resource Strain: Low Risk  (12/16/2024)    Overall Financial Resource Strain (CARDIA)     Difficulty of Paying Living Expenses: Not very hard   Food Insecurity: No Food Insecurity (12/16/2024)    Hunger Vital Sign     Worried About Running Out of Food in the Last Year: Never true     Ran Out of Food in the Last Year: Never true   Transportation Needs: No Transportation Needs (12/16/2024)    PRAPARE - Transportation     Lack of Transportation  (Medical): No     Lack of Transportation (Non-Medical): No   Physical Activity: Not on file   Stress: Not on file   Social Connections: Not on file   Intimate Partner Violence: Not on file   Housing Stability: Low Risk  (12/16/2024)    Housing Stability Vital Sign     Unable to Pay for Housing in the Last Year: No     Number of Times Moved in the Last Year: 0     Homeless in the Last Year: No     Presently lives alone.  Patient is currently employed .    No Known Allergies      Current Outpatient Medications:     cholecalciferol (VITAMIN D3) 400 units tablet, Take 400 Units by mouth daily, Disp: , Rfl:     Levonorgestrel (MIRENA) 20 MCG/DAY IUD, 1 each by Intrauterine Device route Once every 8 years, Disp: , Rfl:     Multiple Vitamin (multivitamin) tablet, Take 1 tablet by mouth daily, Disp: , Rfl:     spironolactone (ALDACTONE) 100 mg tablet, Take 1.5 tablets (150 mg) daily with large glass of water. STOP IMMEDIATELY IF YOU BECOME UNINTENTIONALLY PREGNANT or if you decide to plan for pregnancy. Avoid large amounts of high potassium food or beverages while taking this medication., Disp: 45 tablet, Rfl: 5    tretinoin (RETIN-A) 0.025 % cream, Apply a pea-sized amount evenly to the entire face one hour before bedtime. Start by applying every other night, advance to nightly as tolerated. Avoid eyes and mouth., Disp: 45 g, Rfl: 4      Review of Systems  Constitutional :no fever, feels well, no tiredness, no recent weight gain or loss  ENT: no ear ache, no loss of hearing, no nosebleeds or nasal discharge, no sore throat or hoarseness.  Cardiovascular: no complaints of slow or fast heart beat, no chest pain, no palpitations, no leg claudication or lower extremity edema.  Respiratory: no complaints of shortness of shortness of breath, no ARENAS  Breasts:no complaints of breast pain, breast lump, or nipple discharge  Gastrointestinal: no complaints of abdominal pain, constipation, nausea, vomiting, or diarrhea  "or bloody stools  Genitourinary : no complaints of dysuria, incontinence, +pelvic pain, +dysmenorrhea, no vaginal discharge or abnormal vaginal bleeding and as noted in HPI.  Musculoskeletal: no complaints of arthralgia, no myalgia, no joint swelling or stiffness, no limb pain or swelling.  Integumentary: no complaints of skin rash or lesion, itching or dry skin  Neurological: no complaints of headache, no confusion, no numbness or tingling, no dizziness or fainting      Physical Exam:   /68   Ht 5' 10\" (1.778 m)   Wt 82.2 kg (181 lb 4.8 oz)   BMI 26.01 kg/m²     General: appears stated age, cooperative, alert normal mood and affect   Psychiatric oriented to person, place and time.  Mood and affect normal   Neck: normal, supple,trachea midline, no masses.  Thyroid: normal, no thyromegaly   Heart: regular rate and rhythm, S1, S2 normal, no murmur, click, rub or gallop   Lungs: clear to auscultation bilaterally, no increased work of breathing or signs of respiratory distress   Breasts: normal, no dimpling or skin changes noted   Abdomen: soft, non-tender, without masses or organomegaly   Vulva: normal , no lesions   Vagina: normal , no lesions or dryness   Urethra: normal   Urethal meatus normal   Bladder Normal, soft, non-tender and no prolapse or masses appreciated   Cervix: normal, no palpable masses, IUD string visualized   Uterus: normal , non-tender, not enlarged, no palpable masses   Adnexa: normal, non-tender without fullness or masses   Lymphatic Palpation of lymph nodes in neck, axilla, groin and/or other locations: no lymphadenopathy or masses noted   Skin Normal skin turgor and no rashes.  Palpation of skin and subcutaneous tissue normal.      "

## 2024-12-18 ENCOUNTER — TELEPHONE (OUTPATIENT)
Dept: INTERNAL MEDICINE CLINIC | Facility: CLINIC | Age: 29
End: 2024-12-18

## 2024-12-18 ENCOUNTER — OFFICE VISIT (OUTPATIENT)
Dept: INTERNAL MEDICINE CLINIC | Facility: CLINIC | Age: 29
End: 2024-12-18

## 2024-12-18 VITALS
TEMPERATURE: 97.7 F | DIASTOLIC BLOOD PRESSURE: 71 MMHG | HEIGHT: 72 IN | BODY MASS INDEX: 24.79 KG/M2 | WEIGHT: 183 LBS | HEART RATE: 96 BPM | SYSTOLIC BLOOD PRESSURE: 107 MMHG

## 2024-12-18 DIAGNOSIS — G43.109 MIGRAINE WITH AURA AND WITHOUT STATUS MIGRAINOSUS, NOT INTRACTABLE: Primary | ICD-10-CM

## 2024-12-18 DIAGNOSIS — G47.9 FATIGUE DUE TO SLEEP PATTERN DISTURBANCE: ICD-10-CM

## 2024-12-18 DIAGNOSIS — R53.83 FATIGUE DUE TO SLEEP PATTERN DISTURBANCE: ICD-10-CM

## 2024-12-18 LAB
HPV HR 12 DNA CVX QL NAA+PROBE: NEGATIVE
HPV16 DNA CVX QL NAA+PROBE: NEGATIVE
HPV18 DNA CVX QL NAA+PROBE: NEGATIVE

## 2024-12-18 PROCEDURE — 99205 OFFICE O/P NEW HI 60 MIN: CPT | Performed by: INTERNAL MEDICINE

## 2024-12-18 RX ORDER — ATENOLOL 25 MG/1
25 TABLET ORAL DAILY
Qty: 90 TABLET | Refills: 1 | Status: SHIPPED | OUTPATIENT
Start: 2024-12-18 | End: 2025-06-16

## 2024-12-18 RX ORDER — SUMATRIPTAN 50 MG/1
50 TABLET, FILM COATED ORAL ONCE AS NEEDED
Qty: 10 TABLET | Refills: 5 | Status: SHIPPED | OUTPATIENT
Start: 2024-12-18 | End: 2024-12-19

## 2024-12-18 NOTE — TELEPHONE ENCOUNTER
Folder (To be completed by) -Dr. Corrales /Attending     Name of Form - FMLA    Color folder (blue    Form to be given to patient when completed.      Patient was made aware of the 7-10 business day form policy.   -Care Guide helped patient rescheduled follow up appointment with PCP, per patient requested.  -Patient reported that all medications are current and health insurance is active.  -Care Guide will outreach patient again in 4 weeks and informed to call sooner if needed.

## 2024-12-18 NOTE — PROGRESS NOTES
Name: Gloria Marquez      : 1995      MRN: [id number]  Encounter Provider: Taryn Corrales MD  Encounter Date: 2024   Encounter department: [facility] [place]:  Assessment & Plan  Migraine with aura and without status migrainosus, not intractable  - 4 year history of migraine that starts at the basre of the neck and traverses up the right side of the head before crossing the face in about 2-3 hrs    - With aura of auditory sensitivity   - 3/5 POUND criteria met: pulsating, nausea, and debilitating along with classical aura symptoms   - Is not completely unilateral and therefore lesser chance of vascular malformation     Plan:   - Atenolol 25 mg daily for prevention    - Atenolol chosen over other more commonly used choices because once a day dosing is more manageable for patient who is a     - Sumatriptan 50 mg PRN for abortive purposes   - Counseled patient on keeping a regular schedule in terms of diet and sleep to minimize symptoms and on avoiding caffeine or artifical sweeteners    - Will follow up on specific FMLA needs and complete paperwork afterwards   Orders:    SUMAtriptan (IMITREX) 50 mg tablet; Take 1 tablet (50 mg total) by mouth once as needed for migraine (Start with 50 mg when you feel symptoms starting. If no resolution, can repeat 50 mg in two hours. If you are finding yourself with continued migraines, you can take 100 mg at a time instead of 50. Max dose per 24 hr is 200 mg.) for up to 90 doses may repeat in 2 hours if necessary    atenolol (TENORMIN) 25 mg tablet; Take 1 tablet (25 mg total) by mouth daily    Fatigue due to sleep pattern disturbance  - 4 year history of fatigue irrespectrie   - Scores 2 on the STOP BANG criteria (snoring, tired during daytime) but might be worth further investigating since lives alone and therefore witness apnea not possible   - Differential includes:  - Vitamin deficiencies given vegetarian diet,  lactose intolerance, and gender   - Anemia given age and gender   - Hypothyroidism less likely given the lack of corrobarting symptoms but still worth investigating   - LUIS less likely given age and although it can be an atypical presentation and worth investigating if labs are non revealing     Plan:    Orders:    Vitamin B12; Future    Folate; Future    Iron Panel (Includes Ferritin, Iron Sat%, Iron, and TIBC); Future    CBC and differential; Future    TSH, 3rd generation with Free T4 reflex; Future    Vitamin D 25 hydroxy; Future           History of Present Illness     Gloria Marquez is a 29 year old woman w/ PMH of onchodystophy who presents to [facility], with headaches, and with fatigue.     She describes a 4.5 year history of migraine headaches that start at the base of the neck and work their way up the right side and then across the face to her left. They occur 6-8 times a month and take about 2-3 hours to onset. They rarely start on the left side. She describes a pulsating sensation that is associated with nausea and disables her, causing her to need to lie down in the dark and quiet until symptoms resolve. NSAIDs were ineffective. They are not postural, not sudden onset, and not maximally intensive at onset. No associated fever or nuchal rigidity.     She also describes a 4 year history of fatigue. She sleeps anywhere from 5 - 11 hours a night but always wakes up tired. She reports good sleep hygiene with no electronic exposure 2 hours before bed and sleeps in a cool environment. She reports no constipation, skin or hair changes, slow heart rate. She is a vegetarian and also has almost no dairy intake due to being lactose intolerant. She does eat eggs. Her job as a  doesn't help things but she has had the fatigue prior to the job.       Review of Systems    Objective   /71 (BP Location: Right arm, Patient Position: Sitting, Cuff Size: Standard)   Pulse 96   Temp 97.7 °F  "(36.5 °C) (Temporal)   Ht 5' 11.5\" (1.816 m)   Wt 83 kg (183 lb)   BMI 25.17 kg/m²      Physical Exam  Constitutional:       Appearance: She is normal weight.   HENT:      Head: Normocephalic and atraumatic.   Cardiovascular:      Rate and Rhythm: Normal rate and regular rhythm.      Pulses: Normal pulses.      Heart sounds: Normal heart sounds.   Abdominal:      General: Abdomen is flat. Bowel sounds are normal. There is no distension.      Palpations: Abdomen is soft.      Tenderness: There is no abdominal tenderness. There is no guarding or rebound.      Hernia: No hernia is present.   Musculoskeletal:      Cervical back: Normal range of motion. Tenderness present. No rigidity.   Neurological:      General: No focal deficit present.      Mental Status: She is alert. Mental status is at baseline.      Cranial Nerves: No cranial nerve deficit.      Motor: No weakness.      Gait: Gait normal.      Deep Tendon Reflexes: Reflexes normal.     "

## 2024-12-19 LAB
C TRACH DNA SPEC QL NAA+PROBE: NEGATIVE
N GONORRHOEA DNA SPEC QL NAA+PROBE: NEGATIVE

## 2024-12-19 RX ORDER — SUMATRIPTAN 50 MG/1
TABLET, FILM COATED ORAL
Qty: 30 TABLET | Refills: 0 | Status: SHIPPED | OUTPATIENT
Start: 2024-12-19

## 2024-12-20 ENCOUNTER — RESULTS FOLLOW-UP (OUTPATIENT)
Dept: OBGYN CLINIC | Facility: MEDICAL CENTER | Age: 29
End: 2024-12-20

## 2024-12-20 LAB
LAB AP GYN PRIMARY INTERPRETATION: NORMAL
Lab: NORMAL

## 2024-12-23 ENCOUNTER — HOSPITAL ENCOUNTER (OUTPATIENT)
Dept: ULTRASOUND IMAGING | Facility: HOSPITAL | Age: 29
Discharge: HOME | End: 2024-12-23
Payer: COMMERCIAL

## 2024-12-23 DIAGNOSIS — R10.2 PELVIC PAIN IN FEMALE: ICD-10-CM

## 2024-12-23 LAB — FUNGUS SPEC CULT: NORMAL

## 2024-12-23 PROCEDURE — 76856 US EXAM PELVIC COMPLETE: CPT

## 2024-12-23 PROCEDURE — 76830 TRANSVAGINAL US NON-OB: CPT

## 2024-12-27 NOTE — TELEPHONE ENCOUNTER
Patient called to give a fax number for FMLA. I read the last note regarding FMLA and she stated she made the appt. She understood the papers wont get filled until after the appt on 2/19/25

## 2024-12-27 NOTE — TELEPHONE ENCOUNTER
Per Dr. Jefferson, patient was given medication for her migraines and is to try that for a few weeks and then have a follow up appointment to determine if she needs the FMLA. Office note also stated that patient was going to get full details of what is needed for the form.     I will place form back in BLUE clinical folder until patient returns for follow up appt

## 2024-12-30 LAB — FUNGUS SPEC CULT: NORMAL

## 2024-12-30 NOTE — RESULT ENCOUNTER NOTE
Results reviewed and discussed with Dr. Santamaria. PAS stain demonstrated rare foci suggestive of fungi, however final fungal culture did not demonstrate any fungal growth. Onychodystrophy does not appear to be due to onychomycosis. It is likely secondary to mechanical toe abutting trauma from tight fitting shoes . Recommend referral to podiatry for further evaluation. Called patient. No answer. Left detailed message regarding results. Referral placed.

## 2025-01-02 NOTE — RESULT ENCOUNTER NOTE
Please notify pt that her IUD is in appropriate position and that she has a simple ovarian cyst that does not require f/u.  Pt will also be contacted by surgery scheduler to set up consultation for her surgery.

## 2025-01-03 ENCOUNTER — TELEPHONE (OUTPATIENT)
Dept: GYNECOLOGY | Facility: CLINIC | Age: 30
End: 2025-01-03

## 2025-01-03 NOTE — TELEPHONE ENCOUNTER
----- Message from Mary Lopez MD sent at 1/2/2025 11:24 AM EST -----  Pls set up consultation with Dr. Ascencio regarding possible hysterectomy for this pt.

## 2025-01-20 ENCOUNTER — TELEPHONE (OUTPATIENT)
Dept: OBGYN CLINIC | Facility: MEDICAL CENTER | Age: 30
End: 2025-01-20

## 2025-01-31 ENCOUNTER — TELEPHONE (OUTPATIENT)
Dept: GYNECOLOGY | Facility: CLINIC | Age: 30
End: 2025-01-31

## 2025-01-31 ENCOUNTER — OFFICE VISIT (OUTPATIENT)
Dept: PODIATRY | Facility: CLINIC | Age: 30
End: 2025-01-31
Payer: COMMERCIAL

## 2025-01-31 VITALS — HEIGHT: 71 IN | BODY MASS INDEX: 25.62 KG/M2 | WEIGHT: 183 LBS

## 2025-01-31 DIAGNOSIS — L60.3 ONYCHODYSTROPHY: Primary | ICD-10-CM

## 2025-01-31 PROCEDURE — 99203 OFFICE O/P NEW LOW 30 MIN: CPT | Performed by: PODIATRIST

## 2025-01-31 NOTE — PROGRESS NOTES
Patient ID: Gloria Marquez is a 29 y.o. female Date of Birth 1995       Chief Complaint   Patient presents with    Nail Problem     Left gr toe              Diagnosis:  1. Onychodystrophy  -     Ambulatory Referral to Podiatry       Initial pedal examination with socks and shoes removed bilaterally.  I personally viewed patient's medical records, dermatology notes, PAS stain, mobile culture of left great toenail.  Today we discussed the etiology and treatment options of onychodystrophy.  Proximal nail growth is normal and healthy, distally the nail plate and nailbed are , although the nail that has grown seems to be healthy and free of fungus.  We discussed treatment options, at this time I manually debrided toenail, I explained to her we will see if the nail continues to grow or stays the same, she will photograph it monthly on her cell phone and we will compare.  She may continue to use topical antifungal at the distal nail plate nailbed junction.  Patient understands and agrees with plan will follow-up in 4 to 5 months.        Subjective:   1/31/25 -Gloria presents today upon referral from dermatology for evaluation and care of onychodystrophy to left great toenail.  She states she has been seen by dermatology, fungal culture has been performed, it was negative and she was told she has onychodystrophy.  She has used topical ciclopirox and completed a 3-month oral course of terbinafine with no improvement.    11/25/24 Dermatology HPI and Plan -  Patient presents as a follow-up for onychodystrophy and to have toenail clipping performed for fungal culture to rule out onychomycosis. Patients nail has grown long enough to perform nail clipping. Patient has used topical ciclopirox and a 3 month oral course of terbinafine in the past. Results reviewed and discussed with Dr. Santamaria. PAS stain demonstrated rare foci suggestive of fungi, however final fungal culture did not demonstrate any fungal  growth. Onychodystrophy does not appear to be due to onychomycosis. It is likely secondary to mechanical toe abutting trauma from tight fitting shoes . Recommend referral to podiatry for further evaluation        The following portions of the patient's history were reviewed and updated as appropriate:     Past Medical History:   Diagnosis Date    Acne     Since my young teens    Bunion teens    Migraine summer 2021       Past Surgical History:   Procedure Laterality Date    FRACTURE SURGERY Right     TONSILLECTOMY  2008       Social History     Socioeconomic History    Marital status: Single     Spouse name: None    Number of children: None    Years of education: None    Highest education level: None   Occupational History    None   Tobacco Use    Smoking status: Never     Passive exposure: Never    Smokeless tobacco: Never   Vaping Use    Vaping status: Never Used   Substance and Sexual Activity    Alcohol use: Never    Drug use: Never    Sexual activity: Not Currently     Partners: Male     Birth control/protection: I.U.D.   Other Topics Concern    None   Social History Narrative    None     Social Drivers of Health     Financial Resource Strain: Low Risk  (12/16/2024)    Overall Financial Resource Strain (CARDIA)     Difficulty of Paying Living Expenses: Not very hard   Food Insecurity: No Food Insecurity (12/16/2024)    Hunger Vital Sign     Worried About Running Out of Food in the Last Year: Never true     Ran Out of Food in the Last Year: Never true   Transportation Needs: No Transportation Needs (12/16/2024)    PRAPARE - Transportation     Lack of Transportation (Medical): No     Lack of Transportation (Non-Medical): No   Physical Activity: Not on file   Stress: Not on file   Social Connections: Not on file   Intimate Partner Violence: Not on file   Housing Stability: Low Risk  (12/16/2024)    Housing Stability Vital Sign     Unable to Pay for Housing in the Last Year: No     Number of Times Moved in the Last  "Year: 0     Homeless in the Last Year: No          Current Outpatient Medications:     atenolol (TENORMIN) 25 mg tablet, Take 1 tablet (25 mg total) by mouth daily, Disp: 90 tablet, Rfl: 1    cholecalciferol (VITAMIN D3) 400 units tablet, Take 400 Units by mouth daily, Disp: , Rfl:     Levonorgestrel (MIRENA) 20 MCG/DAY IUD, 1 each by Intrauterine Device route Once every 8 years, Disp: , Rfl:     Multiple Vitamin (multivitamin) tablet, Take 1 tablet by mouth daily, Disp: , Rfl:     spironolactone (ALDACTONE) 100 mg tablet, Take 1.5 tablets (150 mg) daily with large glass of water. STOP IMMEDIATELY IF YOU BECOME UNINTENTIONALLY PREGNANT or if you decide to plan for pregnancy. Avoid large amounts of high potassium food or beverages while taking this medication., Disp: 45 tablet, Rfl: 5    SUMAtriptan (IMITREX) 50 mg tablet, Take 50 mg as needed when you feel migraine starting. Can repeat in two hours if no improvement in symptoms. Max dose per 24 hr is 200 mg, Disp: 30 tablet, Rfl: 0    tretinoin (RETIN-A) 0.025 % cream, Apply a pea-sized amount evenly to the entire face one hour before bedtime. Start by applying every other night, advance to nightly as tolerated. Avoid eyes and mouth., Disp: 45 g, Rfl: 4    Allergies  Patient has no known allergies.    Family History   Problem Relation Age of Onset    Seizures Mother     Asthma Father     Eczema Father     Diabetes Paternal Grandmother     Acne Brother            Objective:  Ht 5' 11\" (1.803 m) Comment: verbal  Wt 83 kg (183 lb)   BMI 25.52 kg/m²     Review of Systems   Constitutional:  Negative for chills and fever.   HENT:  Negative for ear pain and sore throat.    Eyes:  Negative for pain and visual disturbance.   Respiratory:  Negative for cough and shortness of breath.    Cardiovascular:  Negative for chest pain and palpitations.   Gastrointestinal:  Negative for abdominal pain and vomiting.   Genitourinary:  Negative for dysuria and hematuria. "   Musculoskeletal:  Negative for arthralgias and back pain.   Skin:  Negative for color change and rash.        Abnormal left great toenail   Neurological:  Negative for seizures and syncope.   All other systems reviewed and are negative.      Physical Exam  Constitutional:       Appearance: Normal appearance. She is well-developed and normal weight.   HENT:      Head: Normocephalic and atraumatic.      Nose: Nose normal.      Mouth/Throat:      Mouth: Mucous membranes are moist.      Pharynx: Oropharynx is clear.   Eyes:      Conjunctiva/sclera: Conjunctivae normal.      Pupils: Pupils are equal, round, and reactive to light.   Cardiovascular:      Pulses:           Dorsalis pedis pulses are 2+ on the right side and 2+ on the left side.        Posterior tibial pulses are 2+ on the right side and 2+ on the left side.   Pulmonary:      Effort: Pulmonary effort is normal.   Musculoskeletal:         General: Normal range of motion.      Cervical back: Normal range of motion.      Right lower leg: No edema.      Left lower leg: No edema.   Feet:      Right foot:      Protective Sensation: 10 sites tested.  10 sites sensed.      Skin integrity: Skin integrity normal.      Toenail Condition: Right toenails are normal.      Left foot:      Protective Sensation: 10 sites tested.  10 sites sensed.      Skin integrity: Skin integrity normal.      Comments: Left great toenail proximal 50% nail growth is within normal limits, distal nail plate is of normal thickness, it is slightly discolored, it is not adhered to the nail bed, no fungal elements seen, remainder of toenails are in good repair.  Skin:     General: Skin is warm and dry.      Capillary Refill: Capillary refill takes less than 2 seconds.   Neurological:      General: No focal deficit present.      Mental Status: She is alert and oriented to person, place, and time. Mental status is at baseline.   Psychiatric:         Mood and Affect: Mood normal.         Behavior:  "Behavior normal.         Thought Content: Thought content normal.         Judgment: Judgment normal.              No results found.          No pertinent results found.      Summer Castillo, TANG, DPMANGO, FACFAS    Portions of the record may have been created with voice recognition software. Occasional wrong word or \"sound a like\" substitutions may have occurred due to the inherent limitations of voice recognition software. Read the chart carefully and recognize, using context, where substitutions have occurred.  "

## 2025-02-12 ENCOUNTER — APPOINTMENT (OUTPATIENT)
Dept: LAB | Facility: HOSPITAL | Age: 30
End: 2025-02-12
Payer: COMMERCIAL

## 2025-02-12 DIAGNOSIS — R53.83 FATIGUE DUE TO SLEEP PATTERN DISTURBANCE: ICD-10-CM

## 2025-02-12 DIAGNOSIS — G47.9 FATIGUE DUE TO SLEEP PATTERN DISTURBANCE: ICD-10-CM

## 2025-02-12 LAB
25(OH)D3 SERPL-MCNC: 30.4 NG/ML (ref 30–100)
BASOPHILS # BLD AUTO: 0.05 THOUSANDS/ΜL (ref 0–0.1)
BASOPHILS NFR BLD AUTO: 1 % (ref 0–1)
EOSINOPHIL # BLD AUTO: 0.12 THOUSAND/ΜL (ref 0–0.61)
EOSINOPHIL NFR BLD AUTO: 1 % (ref 0–6)
ERYTHROCYTE [DISTWIDTH] IN BLOOD BY AUTOMATED COUNT: 12.7 % (ref 11.6–15.1)
FERRITIN SERPL-MCNC: 73 NG/ML (ref 11–307)
FOLATE SERPL-MCNC: 16.1 NG/ML
HCT VFR BLD AUTO: 41.3 % (ref 34.8–46.1)
HGB BLD-MCNC: 13.8 G/DL (ref 11.5–15.4)
IMM GRANULOCYTES # BLD AUTO: 0.02 THOUSAND/UL (ref 0–0.2)
IMM GRANULOCYTES NFR BLD AUTO: 0 % (ref 0–2)
IRON SATN MFR SERPL: 50 % (ref 15–50)
IRON SERPL-MCNC: 176 UG/DL (ref 50–212)
LYMPHOCYTES # BLD AUTO: 3.14 THOUSANDS/ΜL (ref 0.6–4.47)
LYMPHOCYTES NFR BLD AUTO: 37 % (ref 14–44)
MCH RBC QN AUTO: 32.5 PG (ref 26.8–34.3)
MCHC RBC AUTO-ENTMCNC: 33.4 G/DL (ref 31.4–37.4)
MCV RBC AUTO: 97 FL (ref 82–98)
MONOCYTES # BLD AUTO: 0.6 THOUSAND/ΜL (ref 0.17–1.22)
MONOCYTES NFR BLD AUTO: 7 % (ref 4–12)
NEUTROPHILS # BLD AUTO: 4.49 THOUSANDS/ΜL (ref 1.85–7.62)
NEUTS SEG NFR BLD AUTO: 54 % (ref 43–75)
NRBC BLD AUTO-RTO: 0 /100 WBCS
PLATELET # BLD AUTO: 268 THOUSANDS/UL (ref 149–390)
PMV BLD AUTO: 10.7 FL (ref 8.9–12.7)
RBC # BLD AUTO: 4.25 MILLION/UL (ref 3.81–5.12)
TIBC SERPL-MCNC: 352.8 UG/DL (ref 250–450)
TRANSFERRIN SERPL-MCNC: 252 MG/DL (ref 203–362)
TSH SERPL DL<=0.05 MIU/L-ACNC: 2.11 UIU/ML (ref 0.45–4.5)
UIBC SERPL-MCNC: 177 UG/DL (ref 155–355)
VIT B12 SERPL-MCNC: 372 PG/ML (ref 180–914)
WBC # BLD AUTO: 8.42 THOUSAND/UL (ref 4.31–10.16)

## 2025-02-12 PROCEDURE — 83550 IRON BINDING TEST: CPT

## 2025-02-12 PROCEDURE — 84443 ASSAY THYROID STIM HORMONE: CPT

## 2025-02-12 PROCEDURE — 82746 ASSAY OF FOLIC ACID SERUM: CPT

## 2025-02-12 PROCEDURE — 36415 COLL VENOUS BLD VENIPUNCTURE: CPT

## 2025-02-12 PROCEDURE — 85025 COMPLETE CBC W/AUTO DIFF WBC: CPT

## 2025-02-12 PROCEDURE — 83540 ASSAY OF IRON: CPT

## 2025-02-12 PROCEDURE — 82728 ASSAY OF FERRITIN: CPT

## 2025-02-12 PROCEDURE — 82607 VITAMIN B-12: CPT

## 2025-02-12 PROCEDURE — 82306 VITAMIN D 25 HYDROXY: CPT

## 2025-02-19 ENCOUNTER — OFFICE VISIT (OUTPATIENT)
Dept: INTERNAL MEDICINE CLINIC | Facility: CLINIC | Age: 30
End: 2025-02-19

## 2025-02-19 VITALS
HEART RATE: 86 BPM | TEMPERATURE: 98.1 F | DIASTOLIC BLOOD PRESSURE: 75 MMHG | HEIGHT: 71 IN | SYSTOLIC BLOOD PRESSURE: 106 MMHG | WEIGHT: 176 LBS | BODY MASS INDEX: 24.64 KG/M2

## 2025-02-19 DIAGNOSIS — G47.9 FATIGUE DUE TO SLEEP PATTERN DISTURBANCE: ICD-10-CM

## 2025-02-19 DIAGNOSIS — R53.83 FATIGUE DUE TO SLEEP PATTERN DISTURBANCE: ICD-10-CM

## 2025-02-19 DIAGNOSIS — G43.109 MIGRAINE WITH AURA AND WITHOUT STATUS MIGRAINOSUS, NOT INTRACTABLE: Primary | ICD-10-CM

## 2025-02-19 PROCEDURE — 99214 OFFICE O/P EST MOD 30 MIN: CPT | Performed by: INTERNAL MEDICINE

## 2025-02-19 RX ORDER — NAPROXEN 500 MG/1
500 TABLET ORAL 2 TIMES DAILY PRN
Qty: 30 TABLET | Refills: 1 | Status: SHIPPED | OUTPATIENT
Start: 2025-02-19 | End: 2025-04-20

## 2025-02-19 RX ORDER — RIZATRIPTAN BENZOATE 5 MG/1
5 TABLET ORAL ONCE AS NEEDED
Qty: 10 TABLET | Refills: 5 | Status: SHIPPED | OUTPATIENT
Start: 2025-02-19 | End: 2025-02-20

## 2025-02-19 NOTE — PROGRESS NOTES
Name: Gloria Marquez      : 1995      MRN: [id number]  Encounter Provider: Taryn Corrales MD  Encounter Date: 2025   Encounter department: [facility] [place]:  Assessment & Plan  Migraine with aura and without status migrainosus, not intractable  Acute episodic migraine of about 8 - 12 episodes a month. They are unilateral, 24 hours in duration, nauseating, debilitating, and are precipitated by an auditory aura.  Currently taking atenolol 25 mg dailly for prophylaxis. Still has the same frequency and severity of episodes despite the medication. She also feels dizzy when taking the sumatriptan. Advised on sleep and stress reduction but her job as a . Also she needs [facility] paperwork faxed to her job which will be done.     Plan:   - Increase atenolol to 50 mg daily. Instructed patient to call back in 2 weeks     - if symptoms improved, refill new atenolol at 50 mg dose     - if symptoms NOT improved, will consider further uptitration to 75 mg OR initiate alternate class of medication    - Stop sumatriptan; initiate rizatriptan at 5 mg PRN for abortive therapy    - Initiate naproxen 500 mg PRN in conjunction with sumatriptan for new abortive regimen    - MRI/MRA brain to assess for vascular malformation in the setting of unilateral migraine     Orders:    naproxen (NAPROSYN) 500 mg tablet; Take 1 tablet (500 mg total) by mouth 2 (two) times a day as needed for headaches (with meals)    rizatriptan (MAXALT) 5 mg tablet; Take 1 tablet (5 mg total) by mouth once as needed for migraine for up to 60 doses may repeat in 2 hours if necessary    Comprehensive metabolic panel; Future    MRI brain wo contrast mra head wo; Future    Fatigue due to sleep pattern disturbance  Patient meets criteria of daytime fatigue and drowsiness, snoring, and choking or gasping for air while sleeping. Firstline workup with TSH, CBC, folate, B12, Vitamin D have all been negative. Further  "investigation warranted at this time.     Plan:   - Home sleep study to assess for LUIS   - CMP to evaluate for renal function and electrolyte derangements that can be contributing     Orders:    Ambulatory Referral to Sleep Medicine; Future           History of Present Illness   Gloria Cortes is a 29 year old woman with PMH of acute episodic migraine, endometriosis, and onchodystrophy who presents for follow up regarding migraine. She has acute episodic migraine of about 8 - 12 episodes a month. They are unilateral, 24 hours in duration, nauseating, debilitating, and are precipitated by an auditory aura.  Currently taking atenolol 25 mg dailly for prophylaxis. Still has the same frequency and severity of episodes despite the medication. She also feels dizzy when taking the sumatriptan. She also reports continued fatigue and notes that she has times where she is snoring and choking/gasping for air.       Review of Systems    Objective   /75 (BP Location: Right arm, Patient Position: Sitting, Cuff Size: Large)   Pulse 86   Temp 98.1 °F (36.7 °C) (Temporal)   Ht 5' 11\" (1.803 m)   Wt 79.8 kg (176 lb)   BMI 24.55 kg/m²      Physical Exam  HENT:      Mouth/Throat:      Mouth: Mucous membranes are moist.      Pharynx: Oropharynx is clear. No oropharyngeal exudate or posterior oropharyngeal erythema.   Cardiovascular:      Rate and Rhythm: Normal rate and regular rhythm.      Pulses: Normal pulses.      Heart sounds: Normal heart sounds. No murmur heard.     No friction rub. No gallop.   Pulmonary:      Effort: Pulmonary effort is normal. No respiratory distress.      Breath sounds: Normal breath sounds. No stridor. No wheezing, rhonchi or rales.   Neurological:      General: No focal deficit present.      Mental Status: Mental status is at baseline.      Cranial Nerves: No cranial nerve deficit.      Motor: No weakness.      Gait: Gait normal.      Deep Tendon Reflexes: Reflexes normal.   Psychiatric:       "   Mood and Affect: Mood normal.

## 2025-02-20 DIAGNOSIS — G43.109 MIGRAINE WITH AURA AND WITHOUT STATUS MIGRAINOSUS, NOT INTRACTABLE: ICD-10-CM

## 2025-02-20 RX ORDER — RIZATRIPTAN BENZOATE 5 MG/1
5 TABLET ORAL AS NEEDED
Qty: 10 TABLET | Refills: 5 | Status: SHIPPED | OUTPATIENT
Start: 2025-02-20

## 2025-02-20 NOTE — TELEPHONE ENCOUNTER
Received call from patient's pharmacy requesting to remove the part of the script stating for up to 60 doses on the rizatriptan script    Script adjusted and resent to pharmacy for refill.

## 2025-02-21 ENCOUNTER — TRANSCRIBE ORDERS (OUTPATIENT)
Dept: SLEEP CENTER | Facility: CLINIC | Age: 30
End: 2025-02-21

## 2025-02-21 DIAGNOSIS — R53.83 FATIGUE DUE TO SLEEP PATTERN DISTURBANCE: Primary | ICD-10-CM

## 2025-02-21 DIAGNOSIS — G47.9 FATIGUE DUE TO SLEEP PATTERN DISTURBANCE: Primary | ICD-10-CM

## 2025-02-26 ENCOUNTER — CLINICAL SUPPORT (OUTPATIENT)
Dept: OBGYN CLINIC | Facility: MEDICAL CENTER | Age: 30
End: 2025-02-26
Payer: COMMERCIAL

## 2025-02-26 DIAGNOSIS — Z23 NEED FOR HPV VACCINATION: Primary | ICD-10-CM

## 2025-02-26 PROCEDURE — 90651 9VHPV VACCINE 2/3 DOSE IM: CPT

## 2025-02-26 PROCEDURE — 90471 IMMUNIZATION ADMIN: CPT

## 2025-02-26 NOTE — PROGRESS NOTES
Patient presents for Gardasil injection.  Patient tolerated IM injection right deltoid.  Denied questions or concerns at conclusion of conversation.  Aware repeat injection is due in 4 months.  Encouraged to schedule prior to leaving office at today's visit.  Lot # [id number] NDC [id number] Expiration date 08/18/2026

## 2025-03-26 ENCOUNTER — CONSULT (OUTPATIENT)
Dept: OBGYN CLINIC | Facility: MEDICAL CENTER | Age: 30
End: 2025-03-26
Payer: COMMERCIAL

## 2025-03-26 ENCOUNTER — TELEPHONE (OUTPATIENT)
Dept: INTERNAL MEDICINE CLINIC | Facility: CLINIC | Age: 30
End: 2025-03-26

## 2025-03-26 VITALS
HEIGHT: 71 IN | DIASTOLIC BLOOD PRESSURE: 60 MMHG | SYSTOLIC BLOOD PRESSURE: 100 MMHG | BODY MASS INDEX: 25.34 KG/M2 | WEIGHT: 181 LBS

## 2025-03-26 DIAGNOSIS — N94.6 DYSMENORRHEA: Primary | ICD-10-CM

## 2025-03-26 DIAGNOSIS — N93.9 ABNORMAL UTERINE BLEEDING (AUB): ICD-10-CM

## 2025-03-26 DIAGNOSIS — Z30.2 REQUEST FOR STERILIZATION: ICD-10-CM

## 2025-03-26 PROCEDURE — 58100 BIOPSY OF UTERUS LINING: CPT | Performed by: OBSTETRICS & GYNECOLOGY

## 2025-03-26 PROCEDURE — 99214 OFFICE O/P EST MOD 30 MIN: CPT | Performed by: OBSTETRICS & GYNECOLOGY

## 2025-03-26 PROCEDURE — 88305 TISSUE EXAM BY PATHOLOGIST: CPT | Performed by: STUDENT IN AN ORGANIZED HEALTH CARE EDUCATION/TRAINING PROGRAM

## 2025-03-26 NOTE — PROGRESS NOTES
Assessment:  29 y.o.  who presents as a consult for hysterectomy in setting of long standing aub/dysmenorrhea, desire for sterilization.    Plan:  Diagnoses and all orders for this visit:    Dysmenorrhea  Abnormal uterine bleeding (AUB)  -     Tissue Exam  -     Endometrial biopsy  - Task sent for surgical scheduling    Request for sterilization    - Understands hysterectomy prevents childbearing in future; this is in alignment with her fertility goals     __________________________________________________________________    Subjective   Gloria Marquez is a 29 y.o.  who presents as a consult for hysterectomy.     Patient reports menses are heavy and painful at baseline. She is currently using an IUD for control and largely well managed on same, but its always been this way and did not improve over time. Based on her age, the IUD would need to be exchanged multiple times before she gets to menopause. She has known since 14yo she never wants children and in this setting, she would like to discuss more definitive mgmt options like hysterectomy. She has no acute changes to symptoms, exacerbations, or new factors to report today.    I reviewed mgmt options with pt -- continued IUD, endometrial ablation, hysterectomy. She is aware of IUD limitations on duration of use and that it would need infrequent, but regular maintenance (q5-8yr, anticipated over next ~25yr). Ablation is effective for flow, but again can be limited in long term efficacy at her age, and requires use of contraceptive plan to prevent pregnancy (as this is contraindicated and not her goal, but ablation alone not protective). Discussed hysterectomy is more definitive, but more invasive/longer recovery. After discussion, she remains confident of her plan for hysterectomy. Reviewed UZMA, BS in detail with the patient. We reviewed the risks of the procedure include bleeding, infection, injury to bowel/bladder/ureters/neurovascular  "structures, or diagnosis of occult pathology. We reviewed that an EMB is performed to reasonably exclude malignancy, but that this only assesses the endometrium and is not as comprehensive as the surgical pathology will be. We reviewed preop instructions, same-day surgery, recovery, and follow up. Patient had the opportunity to ask questions, which were answered to her satisfaction. Informed consent was obtained.           The following portions of the patient's history were reviewed and updated as appropriate: allergies, current medications, past medical history, past social history, past surgical history, and problem list.    Review of Systems  Review of Systems   Constitutional:  Negative for chills, fatigue and fever.   Respiratory:  Negative for shortness of breath.    Cardiovascular:  Negative for chest pain and palpitations.   Gastrointestinal:  Negative for abdominal distention, abdominal pain, constipation, diarrhea, nausea and vomiting.   Genitourinary:  Negative for dyspareunia, dysuria, flank pain, frequency, menstrual problem (well managed), pelvic pain, vaginal bleeding, vaginal discharge and vaginal pain.   Neurological:  Negative for dizziness, light-headedness and headaches.            Objective  /60   Ht 5' 11\" (1.803 m)   Wt 82.1 kg (181 lb)   BMI 25.24 kg/m²      Physical Exam:  Physical Exam  Exam conducted with a chaperone present.   Constitutional:       General: She is not in acute distress.     Appearance: Normal appearance. She is not ill-appearing, toxic-appearing or diaphoretic.   Eyes:      General: No scleral icterus.        Right eye: No discharge.         Left eye: No discharge.      Conjunctiva/sclera: Conjunctivae normal.   Cardiovascular:      Rate and Rhythm: Normal rate.   Pulmonary:      Effort: Pulmonary effort is normal. No respiratory distress.   Abdominal:      General: There is no distension.      Palpations: There is no mass.      Tenderness: There is no abdominal " tenderness. There is no guarding or rebound.      Hernia: No hernia is present.   Genitourinary:     General: Normal vulva.      Exam position: Lithotomy position.      Labia:         Right: No rash, tenderness or lesion.         Left: No rash, tenderness or lesion.       Urethra: No prolapse, urethral swelling or urethral lesion.      Vagina: No signs of injury. No vaginal discharge, erythema, tenderness, bleeding or prolapsed vaginal walls.      Cervix: No cervical motion tenderness, discharge, friability, lesion, erythema or cervical bleeding.   Musculoskeletal:         General: No swelling.   Skin:     General: Skin is warm and dry.      Coloration: Skin is not jaundiced or pale.      Findings: No bruising or erythema.   Neurological:      Mental Status: She is alert.   Psychiatric:         Mood and Affect: Mood normal.         Behavior: Behavior normal.         Thought Content: Thought content normal.         Judgment: Judgment normal.           Lab Review  Pelvic US (12/2024)  CBC, CMP, TSH, iron panel, folate, b12, vit D (2/2025)

## 2025-03-26 NOTE — TELEPHONE ENCOUNTER
Patient stated that she was told by pcp to double up on the atenolol (TENORMIN) 25 mg tablet to 50mg however she states it's hasn't changed the symptoms she was experiencing previously and wants to know is their is anything else she could take - please advise

## 2025-03-28 DIAGNOSIS — G43.909 MIGRAINE: Primary | ICD-10-CM

## 2025-03-28 RX ORDER — TOPIRAMATE 25 MG/1
25 TABLET, FILM COATED ORAL DAILY
Qty: 30 TABLET | Refills: 1 | Status: SHIPPED | OUTPATIENT
Start: 2025-03-28

## 2025-03-28 NOTE — PROGRESS NOTES
Note: Attempted to call patient and instruct her as below. Could not reach her so left a voicemail with a brief summary of the below findings. Will attempt to call her back next week.     Patient has had no improvement in frequency or intensity of migraines since initiating beta blocker therapy with atenolol 25 mg and even after dose escalation to 50 mg. Given the lack of partial response, plan is to discontinue atenolol and initiate alternate prophylactic regimen with topirimate 25 mg daily with self-escalation of dose by 25 mg every 2 weeks until max dose achieved at 100 mg daily in 8 weeks. Topirimate chosen for its effect profile when compared to TCAs and patient's history of menstrual irregularities, which is a relative contraindication to valproate use. Patient not instructed to get pregnancy test as she currently has a Mirena IUD.     She was counseled on the risks and side effects of this medication. Patient advised that she needs follow up and scheduling an appointment for 6 weeks. Eating disorder screen for Primary care ([facility]) will be done prior to initiating therapy.

## 2025-04-02 ENCOUNTER — TELEPHONE (OUTPATIENT)
Dept: OBGYN CLINIC | Facility: MEDICAL CENTER | Age: 30
End: 2025-04-02

## 2025-04-02 NOTE — TELEPHONE ENCOUNTER
----- Message from Marlee Ascencio MD sent at 2025  9:34 AM EDT -----  Regarding: schedule surgery  [facility] GYN Department  Surgery Scheduling Sheet    Patient Name: Gloria Marquez  : 1995    Provider: Marlee Ascencio MD     Needed: no; Language: N/A    Procedure: exam under anesthesia, total laparoscopic hysterectomy, cystoscopy, and bilateral salpingectomy    Diagnosis: AUB    Special Needs or Equipment: none    Anesthesia: General anesthesia    Length of stay: outpatient  Does patient have comorbid conditions that will require close perioperative monitoring prior to safe discharge: no    The patient has comorbid conditions that will require close perioperative monitoring prior to safe discharge, including N/A.   This may require acute care beyond the usual and routine recovery period. As such, inpatient admission post-operatively is expected and appropriate, and anticipated hospital length of stay will be >2 midnights.    Pre-Admission Testing Needed: yes   Labs that should be ordered: cbc, hgb A1C, type and screen, BMP, and urine pregnancy test    Order PAT that is recommended in prep for procedure?: Yes    Medical Clearance Needed: no; Provider: N/A    MA Form Signed (tubals/hysterectomy): Not Indicated    Surgical Drink Given: no     How many days out of work: 6 week(s)     How many days no drivin week(s)       Is pre op appt needed?  No - can just pickup preop items vs preop per pt pref. EMB completed  Interval for post op appt: 2 week(s)       For Surgical Scheduler:     Surgery Scheduled On:  [place]: [facility]    Pre-op Appt:   Post op Appt:  Consult/Medical clearance appt:

## 2025-04-07 ENCOUNTER — TELEPHONE (OUTPATIENT)
Dept: OTHER | Facility: HOSPITAL | Age: 30
End: 2025-04-07

## 2025-04-07 NOTE — TELEPHONE ENCOUNTER
Called patient and left voicemail explaining previous atenolol -> topirimate prophylaxis regimen change. Please see patient call from March 2025

## 2025-04-18 NOTE — PROGRESS NOTES
Endometrial biopsy    Date/Time: 3/26/2025 9:30 AM    Performed by: Marlee Ascencio MD  Authorized by: Marlee Ascencio MD  Universal Protocol:  procedure performed by consultantConsent: Verbal consent obtained. Written consent obtained.  Risks and benefits: risks, benefits and alternatives were discussed  Consent given by: patient  Patient understanding: patient states understanding of the procedure being performed  Patient consent: the patient's understanding of the procedure matches consent given  Procedure consent: procedure consent matches procedure scheduled  Required items: required blood products, implants, devices, and special equipment available    Indication:     Indications: Other disorder of menstruation and other abnormal bleeding from female genital tract      Indications comment:  Aub, preop  Pre-procedure:     Negative urine pregnancy test: yes    Procedure:     Procedure: endometrial biopsy with Pipelle      A bivalve speculum was placed in the vagina: yes      Cervix cleaned and prepped: yes      The cervix was dilated: no      Uterus sounded: yes      Uterus sound depth (cm):  7    Specimen collected: specimen collected and sent to pathology      Patient tolerated procedure well with no complications: yes

## 2025-05-22 ENCOUNTER — TELEMEDICINE (OUTPATIENT)
Dept: DERMATOLOGY | Facility: CLINIC | Age: 30
End: 2025-05-22

## 2025-05-22 DIAGNOSIS — L70.0 ACNE VULGARIS: Primary | ICD-10-CM

## 2025-05-22 DIAGNOSIS — L70.0 ACNE VULGARIS: ICD-10-CM

## 2025-05-22 RX ORDER — SPIRONOLACTONE 100 MG/1
TABLET, FILM COATED ORAL
Qty: 45 TABLET | Refills: 5 | Status: SHIPPED | OUTPATIENT
Start: 2025-05-22

## 2025-05-22 RX ORDER — TRETINOIN 0.25 MG/G
GEL TOPICAL
Qty: 45 G | Refills: 2 | Status: SHIPPED | OUTPATIENT
Start: 2025-05-22

## 2025-05-22 NOTE — PROGRESS NOTES
"[facility] D[facility]atology Clinic Note     Patient Name: Gloria Marquez  Encounter Date: 2025       Have you been cared for by a [facility] Dermatologist in the last 3 years and, if so, which description applies to you? Yes. I have been here within the last 3 years, and my medical history has NOT changed since that time. I am of child-bearing potential.     REVIEW OF SYSTEMS:  Have you recently had or currently have any of the following? No changes in my recent health.   PAST MEDICAL HISTORY:  Have you personally ever had or currently have any of the following?  If \"YES,\" then please provide more detail. No changes in my medical history.   HISTORY OF IMMUNOSUPPRESSION: Do you have a history of any of the following:  Systemic Immunosuppression such as Diabetes, Biologic or Immunotherapy, Chemotherapy, Organ Transplantation, Bone Marrow Transplantation or Prednisone?  No     Answering \"YES\" requires the addition of the dotphrase \"IMMUNOSUPPRESSED\" as the first diagnosis of the patient's visit.   FAMILY HISTORY:  Any \"first degree relatives\" (parent, brother, sister, or child) with the following?    No changes in my family's known health.   PATIENT EXPERIENCE:    Do you want the Dermatologist to perform a COMPLETE skin exam today including a clinical examination under the \"bra and underwear\" areas?  NO  If necessary, do we have your permission to call and leave a detailed message on your Preferred Phone number that includes your specific medical information?  Yes      Allergies[1] Current Medications[2]              Whom besides the patient is providing clinical information about today's encounter?   NO ADDITIONAL HISTORIAN (patient alone provided history)    Physical Exam and Assessment/Plan by Diagnosis:    Virtual Regular VisitName: Gloria Marquez      : 1995      MRN: [id number]  Encounter Provider: Aye Morgan PA-C  Encounter Date: 2025   Encounter department: [facility] DERMATOLOGY " [place]  :  Assessment & Plan        History of Present Illness     HPI  Review of Systems    Objective   There were no vitals taken for this visit.    Physical Exam    Administrative Statements   Encounter provider Aye Morgan PA-C    The Patient is located at Home and in the following state in which I hold an active license [place].    The patient was identified by name and date of birth. Gloria Marquez was informed that this is a telemedicine visit and that the visit is being conducted through the Epic Embedded platform. She agrees to proceed..  My office door was closed. No one else was in the room.  She acknowledged consent and understanding of privacy and security of the video platform. The patient has agreed to participate and understands they can discontinue the visit at any time.    I have spent a total time of 20 minutes in caring for this patient on the day of the visit/encounter including Counseling / Coordination of care, Documenting in the medical record, Reviewing/placing orders in the medical record (including tests, medications, and/or procedures), Obtaining or reviewing history  , and physical exam, not including the time spent for establishing the audio/video connection.    ACNE VULGARIS    Physical Exam:  Anatomic Location Affected:  face   Morphological Description: clear   Pertinent Positives:      Additional History of Present Condition:  Patient presents as a follow up for acne. Patient has been using topical tretinoin 0.025% cream nightly and taking oral spironolactone 150mg daily. She uses a LaRoche Posay gentle facial cleanser. She is tolerating these well. She has no plans of pregnancy at this time. She would like to switch to the gel version of the tretinoin as she felt it sunk into her skin better when she tried it in the past.     Discussed that treatment is directed at improving skin appearance and reducing the likelihood of scarring. Discussed theraputic ladder including  topical OTC treatments, topical prescriptions, and oral medications. Discussed side effects as noted below.     Plan today:     AM:  -Gentle cleanser, such as CeraVe, Cetaphil or La Roche Posay  - SPF 30 or greater (can be a lotion with SPF like CeraVe AM)/non-comedogenic moisturizer such as CeraVe, Cetaphil or Vanicream.     PM:  - Gentle cleanser, such as CeraVe, Cetaphil or La Roche Posay  - Start Tretinoin 0.025% gel. Educated that this medication can be drying and irritating.  - non-comedogenic moisturizer such as CeraVe, Cetaphil or Vanicream. Can be used on top of retinoid.  -Continue oral spironolactone 150mg daily. S/E reviewed including menstrual irregularity, breast tenderness, headaches, GI upset, K+ retention, palpitations, teratogenicity/feminization of male fetus       Follow up in 6 months, sooner for concerns.  Aye Morgan PA-C           [1] No Known Allergies  [2]   Current Outpatient Medications:     atenolol (TENORMIN) 25 mg tablet, Take 1 tablet (25 mg total) by mouth daily, Disp: 90 tablet, Rfl: 1    cholecalciferol (VITAMIN D3) 400 units tablet, Take 400 Units by mouth daily, Disp: , Rfl:     Levonorgestrel (MIRENA) 20 MCG/DAY IUD, 1 each by Intrauterine Device route Once every 8 years, Disp: , Rfl:     Multiple Vitamin (multivitamin) tablet, Take 1 tablet by mouth daily, Disp: , Rfl:     naproxen (NAPROSYN) 500 mg tablet, Take 1 tablet (500 mg total) by mouth 2 (two) times a day as needed for headaches (with meals), Disp: 30 tablet, Rfl: 1    rizatriptan (MAXALT) 5 mg tablet, Take 1 tablet (5 mg total) by mouth as needed for migraine may repeat in 2 hours if necessary, Disp: 10 tablet, Rfl: 5    spironolactone (ALDACTONE) 100 mg tablet, Take 1.5 tablets (150 mg) daily with large glass of water. STOP IMMEDIATELY IF YOU BECOME UNINTENTIONALLY PREGNANT or if you decide to plan for pregnancy. Avoid large amounts of high potassium food or beverages while taking this medication., Disp: 45  tablet, Rfl: 5    topiramate (Topamax) 25 mg tablet, Take 1 tablet (25 mg total) by mouth daily, Disp: 30 tablet, Rfl: 1    tretinoin (RETIN-A) 0.025 % gel, Apply a pea-sized amount evenly to the entire face one hour before bedtime.  Avoid eyes and mouth., Disp: 45 g, Rfl: 2

## 2025-06-04 DIAGNOSIS — G43.909 MIGRAINE: ICD-10-CM

## 2025-06-04 NOTE — TELEPHONE ENCOUNTER
Sent message via [employer] to patient to confirm what dose of Topamax she is currently taking

## 2025-06-04 NOTE — PROGRESS NOTES
Patient ID: Gloria Marquez is a 29 y.o. female Date of Birth 1995       No chief complaint on file.            Diagnosis:  1. Onychodystrophy     Pedal examination with socks and shoes removed bilaterally.  I personally viewed patient's medical records, dermatology notes, PAS stain, mobile culture of left great toenail.  Today we discussed the etiology and treatment options of onychodystrophy.  Proximal nail growth is normal and healthy, distally the nail plate and nailbed are reattached,  the nail that has grown seems to be healthy and free of fungus.  We discussed treatment options, at this time as her final culture did not show any fungal elements we have deferred any further treatment.  She may continue to use topical antifungal at the distal nail plate nailbed junction.   Patient understands and agrees with plan will follow-up as needed.          Subjective:   6/5/25 -Gloria presents today for follow-up evaluation care of abnormal left great toenail, she states she has continued to use topical antifungal, been monitoring her toenail, it is growing back more attached, she states she has been going through her MyChart and noticed there was some fungal elements noted on the dermatology report and is wondering if we need to do anything.    1/31/25 -Gloria presents today upon referral from dermatology for evaluation and care of onychodystrophy to left great toenail.  She states she has been seen by dermatology, fungal culture has been performed, it was negative and she was told she has onychodystrophy.  She has used topical ciclopirox and completed a 3-month oral course of terbinafine with no improvement.     11/25/24 Dermatology HPI and Plan -  Patient presents as a follow-up for onychodystrophy and to have toenail clipping performed for fungal culture to rule out onychomycosis. Patients nail has grown long enough to perform nail clipping. Patient has used topical ciclopirox and a 3 month oral course of  terbinafine in the past. Results reviewed and discussed with Dr. Santamaria. PAS stain demonstrated rare foci suggestive of fungi, however final fungal culture did not demonstrate any fungal growth. Onychodystrophy does not appear to be due to onychomycosis. It is likely secondary to mechanical toe abutting trauma from tight fitting shoes . Recommend referral to podiatry for further evaluation             The following portions of the patient's history were reviewed and updated as appropriate: allergies, current medications, past family history, past medical history, past social history, past surgical history, and problem list.        Objective:  There were no vitals taken for this visit.    Review of Systems    Physical Exam  Constitutional:       Appearance: Normal appearance. She is well-developed and normal weight.   HENT:      Head: Normocephalic and atraumatic.      Nose: Nose normal.      Mouth/Throat:      Mouth: Mucous membranes are moist.      Pharynx: Oropharynx is clear.     Eyes:      Conjunctiva/sclera: Conjunctivae normal.      Pupils: Pupils are equal, round, and reactive to light.       Cardiovascular:      Pulses:           Dorsalis pedis pulses are 2+ on the right side and 2+ on the left side.        Posterior tibial pulses are 2+ on the right side and 2+ on the left side.   Pulmonary:      Effort: Pulmonary effort is normal.     Musculoskeletal:         General: Normal range of motion.      Cervical back: Normal range of motion.      Right lower leg: No edema.      Left lower leg: No edema.   Feet:      Right foot:      Protective Sensation: 10 sites tested.  10 sites sensed.      Skin integrity: Skin integrity normal.      Toenail Condition: Right toenails are abnormally thick.      Left foot:      Protective Sensation: 10 sites tested.  10 sites sensed.      Skin integrity: Skin integrity normal.      Toenail Condition: Left toenails are normal.      Comments: Left great toenail proximal  nail  "growth is within normal limits, distal nail plate is of normal thickness, no longer discolored, it is now adhered to the nail bed, no fungal elements seen, remainder of toenails are in good repair.    Skin:     General: Skin is warm and dry.      Capillary Refill: Capillary refill takes less than 2 seconds.     Neurological:      General: No focal deficit present.      Mental Status: She is alert and oriented to person, place, and time. Mental status is at baseline.     Psychiatric:         Mood and Affect: Mood normal.         Behavior: Behavior normal.         Thought Content: Thought content normal.         Judgment: Judgment normal.             No pertinent results found.      Summer Castillo, TANG, DPM, FACFAS    Portions of the record may have been created with voice recognition software. Occasional wrong word or \"sound a like\" substitutions may have occurred due to the inherent limitations of voice recognition software. Read the chart carefully and recognize, using context, where substitutions have occurred.  "

## 2025-06-05 ENCOUNTER — PROCEDURE VISIT (OUTPATIENT)
Dept: PODIATRY | Facility: CLINIC | Age: 30
End: 2025-06-05
Payer: COMMERCIAL

## 2025-06-05 VITALS — WEIGHT: 181 LBS | HEIGHT: 71 IN | BODY MASS INDEX: 25.34 KG/M2

## 2025-06-05 DIAGNOSIS — L60.3 ONYCHODYSTROPHY: Primary | ICD-10-CM

## 2025-06-05 PROCEDURE — 99213 OFFICE O/P EST LOW 20 MIN: CPT | Performed by: PODIATRIST

## 2025-06-05 NOTE — TELEPHONE ENCOUNTER
Attempted to reach patient at . Left voice message and callback number to verify Topamax dose. Per physician note, patient plan was to titrate up to 100 mg.

## 2025-06-06 RX ORDER — TOPIRAMATE 25 MG/1
25 TABLET, FILM COATED ORAL DAILY
Qty: 30 TABLET | Refills: 3 | Status: SHIPPED | OUTPATIENT
Start: 2025-06-06

## 2025-06-10 ENCOUNTER — APPOINTMENT (OUTPATIENT)
Dept: LAB | Facility: HOSPITAL | Age: 30
End: 2025-06-10
Payer: COMMERCIAL

## 2025-06-10 DIAGNOSIS — G43.109 MIGRAINE WITH AURA AND WITHOUT STATUS MIGRAINOSUS, NOT INTRACTABLE: ICD-10-CM

## 2025-06-10 DIAGNOSIS — Z01.818 PRE-OP TESTING: ICD-10-CM

## 2025-06-10 LAB
ABO GROUP BLD: NORMAL
ALBUMIN SERPL BCG-MCNC: 4.8 G/DL (ref 3.5–5)
ALP SERPL-CCNC: 54 U/L (ref 34–104)
ALT SERPL W P-5'-P-CCNC: 12 U/L (ref 7–52)
ANION GAP SERPL CALCULATED.3IONS-SCNC: 6 MMOL/L (ref 4–13)
AST SERPL W P-5'-P-CCNC: 14 U/L (ref 13–39)
BASOPHILS # BLD AUTO: 0.08 THOUSANDS/ÂΜL (ref 0–0.1)
BASOPHILS NFR BLD AUTO: 1 % (ref 0–1)
BILIRUB SERPL-MCNC: 0.52 MG/DL (ref 0.2–1)
BLD GP AB SCN SERPL QL: NEGATIVE
BUN SERPL-MCNC: 11 MG/DL (ref 5–25)
CALCIUM SERPL-MCNC: 9.9 MG/DL (ref 8.4–10.2)
CHLORIDE SERPL-SCNC: 109 MMOL/L (ref 96–108)
CO2 SERPL-SCNC: 23 MMOL/L (ref 21–32)
CREAT SERPL-MCNC: 0.85 MG/DL (ref 0.6–1.3)
EOSINOPHIL # BLD AUTO: 0.08 THOUSAND/ÂΜL (ref 0–0.61)
EOSINOPHIL NFR BLD AUTO: 1 % (ref 0–6)
ERYTHROCYTE [DISTWIDTH] IN BLOOD BY AUTOMATED COUNT: 12.2 % (ref 11.6–15.1)
EST. AVERAGE GLUCOSE BLD GHB EST-MCNC: 114 MG/DL
GFR SERPL CREATININE-BSD FRML MDRD: 93 ML/MIN/1.73SQ M
GLUCOSE P FAST SERPL-MCNC: 97 MG/DL (ref 65–99)
HBA1C MFR BLD: 5.6 %
HCT VFR BLD AUTO: 44.5 % (ref 34.8–46.1)
HGB BLD-MCNC: 15 G/DL (ref 11.5–15.4)
IMM GRANULOCYTES # BLD AUTO: 0.02 THOUSAND/UL (ref 0–0.2)
IMM GRANULOCYTES NFR BLD AUTO: 0 % (ref 0–2)
LYMPHOCYTES # BLD AUTO: 2.36 THOUSANDS/ÂΜL (ref 0.6–4.47)
LYMPHOCYTES NFR BLD AUTO: 31 % (ref 14–44)
MCH RBC QN AUTO: 31.4 PG (ref 26.8–34.3)
MCHC RBC AUTO-ENTMCNC: 33.7 G/DL (ref 31.4–37.4)
MCV RBC AUTO: 93 FL (ref 82–98)
MONOCYTES # BLD AUTO: 0.44 THOUSAND/ÂΜL (ref 0.17–1.22)
MONOCYTES NFR BLD AUTO: 6 % (ref 4–12)
NEUTROPHILS # BLD AUTO: 4.56 THOUSANDS/ÂΜL (ref 1.85–7.62)
NEUTS SEG NFR BLD AUTO: 61 % (ref 43–75)
NRBC BLD AUTO-RTO: 0 /100 WBCS
PLATELET # BLD AUTO: 317 THOUSANDS/UL (ref 149–390)
PMV BLD AUTO: 10.4 FL (ref 8.9–12.7)
POTASSIUM SERPL-SCNC: 4.1 MMOL/L (ref 3.5–5.3)
PROT SERPL-MCNC: 7.4 G/DL (ref 6.4–8.4)
RBC # BLD AUTO: 4.78 MILLION/UL (ref 3.81–5.12)
RH BLD: POSITIVE
SODIUM SERPL-SCNC: 138 MMOL/L (ref 135–147)
SPECIMEN EXPIRATION DATE: NORMAL
WBC # BLD AUTO: 7.54 THOUSAND/UL (ref 4.31–10.16)

## 2025-06-10 PROCEDURE — 86850 RBC ANTIBODY SCREEN: CPT

## 2025-06-10 PROCEDURE — 36415 COLL VENOUS BLD VENIPUNCTURE: CPT

## 2025-06-10 PROCEDURE — 86900 BLOOD TYPING SEROLOGIC ABO: CPT

## 2025-06-10 PROCEDURE — 86901 BLOOD TYPING SEROLOGIC RH(D): CPT

## 2025-06-10 PROCEDURE — 80053 COMPREHEN METABOLIC PANEL: CPT

## 2025-06-10 PROCEDURE — 83036 HEMOGLOBIN GLYCOSYLATED A1C: CPT

## 2025-06-10 PROCEDURE — 85025 COMPLETE CBC W/AUTO DIFF WBC: CPT

## 2025-06-12 ENCOUNTER — TELEPHONE (OUTPATIENT)
Dept: INTERNAL MEDICINE CLINIC | Facility: CLINIC | Age: 30
End: 2025-06-12

## 2025-06-12 NOTE — TELEPHONE ENCOUNTER
Called and updated patient about recent BMP results, which are unremarkable and do not explain her chronic fatigue. Informed her to follow up with the sleep study when she has time. She is focused on her hysterectomy later this month and will pursue the sleep study afterwards.

## 2025-06-16 NOTE — PRE-PROCEDURE INSTRUCTIONS
Pre-Surgery Instructions:   Medication Instructions    atenolol (TENORMIN) 25 mg tablet Take day of surgery.    cholecalciferol (VITAMIN D3) 400 units tablet Hold day of surgery.    Multiple Vitamin (multivitamin) tablet Stop taking 7 days prior to surgery.    rizatriptan (MAXALT) 5 mg tablet Uses PRN- OK to take day of surgery    spironolactone (ALDACTONE) 100 mg tablet Hold day of surgery.    topiramate (TOPAMAX) 25 mg tablet Take day of surgery.    tretinoin (RETIN-A) 0.025 % gel Hold day of surgery.   Medication instructions for day of surgery reviewed. Please take all instructed medications with only a sip of water. Please do not take any over the counter (non-prescribed) vitamins or supplements for one week prior to date of surgery.      You will receive a call one business day prior to surgery with an arrival time and hospital directions. If your surgery is scheduled on a Monday, the hospital will be calling you on the Friday prior to your surgery. If you have not heard from anyone by 8pm, please call the hospital supervisor through the hospital  at [phone]. ([place] [phone] or [place] [phone]).    Do not eat or drink anything after midnight the night before your surgery, including candy, mints, lifesavers, or chewing gum. Do not drink alcohol 24hrs before your surgery. Try not to smoke at least 24hrs before your surgery.       Follow the pre surgery showering instructions as listed in the “My Surgical Experience Booklet” or otherwise provided by your surgeon's office. Do not use a blade to shave the surgical area 1 week before surgery. It is okay to use a clean electric clippers up to 24 hours before surgery. Do not apply any lotions, creams, including makeup, cologne, deodorant, or perfumes after showering on the day of your surgery. Do not use dry shampoo, hair spray, hair gel, or any type of hair products.     No contact lenses, eye make-up, or artificial eyelashes. Remove nail  polish, including gel polish, and any artificial, gel, or acrylic nails if possible. Remove all jewelry including rings and body piercing jewelry.     Wear causal clothing that is easy to take on and off. Consider your type of surgery.    Keep any valuables, jewelry, piercings at home. Please bring any specially ordered equipment (sling, braces) if indicated.    Arrange for a responsible person to drive you to and from the hospital on the day of your surgery. Please confirm the visitor policy for the day of your procedure when you receive your phone call with an arrival time.     Call the surgeon's office with any new illnesses, exposures, or additional questions prior to surgery.    Please reference your “My Surgical Experience Booklet” for additional information to prepare for your upcoming surgery.

## 2025-06-19 DIAGNOSIS — G43.109 MIGRAINE WITH AURA AND WITHOUT STATUS MIGRAINOSUS, NOT INTRACTABLE: ICD-10-CM

## 2025-06-20 RX ORDER — ATENOLOL 25 MG/1
25 TABLET ORAL DAILY
Qty: 90 TABLET | Refills: 0 | OUTPATIENT
Start: 2025-06-20

## 2025-06-20 NOTE — TELEPHONE ENCOUNTER
Per most recent progress note from Dr. Corrales (3/28/25), it appears that patient was due to stop atenolol in favor of topiramate. If this is the case she should be off of it entirely by now.

## 2025-06-23 ENCOUNTER — ANESTHESIA EVENT (OUTPATIENT)
Dept: PERIOP | Facility: HOSPITAL | Age: 30
End: 2025-06-23
Payer: COMMERCIAL

## 2025-06-24 RX ORDER — SODIUM CHLORIDE, SODIUM LACTATE, POTASSIUM CHLORIDE, CALCIUM CHLORIDE 600; 310; 30; 20 MG/100ML; MG/100ML; MG/100ML; MG/100ML
20 INJECTION, SOLUTION INTRAVENOUS CONTINUOUS
Status: CANCELLED | OUTPATIENT
Start: 2025-06-24

## 2025-06-25 ENCOUNTER — ANESTHESIA (OUTPATIENT)
Dept: PERIOP | Facility: HOSPITAL | Age: 30
End: 2025-06-25
Payer: COMMERCIAL

## 2025-06-25 ENCOUNTER — HOSPITAL ENCOUNTER (OUTPATIENT)
Facility: HOSPITAL | Age: 30
Setting detail: OUTPATIENT SURGERY
Discharge: HOME | End: 2025-06-25
Attending: OBSTETRICS & GYNECOLOGY | Admitting: OBSTETRICS & GYNECOLOGY
Payer: COMMERCIAL

## 2025-06-25 VITALS
BODY MASS INDEX: 23.77 KG/M2 | SYSTOLIC BLOOD PRESSURE: 96 MMHG | WEIGHT: 170.42 LBS | RESPIRATION RATE: 16 BRPM | OXYGEN SATURATION: 99 % | DIASTOLIC BLOOD PRESSURE: 52 MMHG | TEMPERATURE: 97.4 F | HEART RATE: 70 BPM

## 2025-06-25 DIAGNOSIS — Z90.710 S/P LAPAROSCOPIC HYSTERECTOMY: Primary | ICD-10-CM

## 2025-06-25 DIAGNOSIS — N94.6 DYSMENORRHEA: ICD-10-CM

## 2025-06-25 DIAGNOSIS — G43.109 MIGRAINE WITH AURA AND WITHOUT STATUS MIGRAINOSUS, NOT INTRACTABLE: ICD-10-CM

## 2025-06-25 PROBLEM — Z30.2 REQUEST FOR STERILIZATION: Status: ACTIVE | Noted: 2025-06-25

## 2025-06-25 LAB
ABO GROUP BLD: NORMAL
EXT PREGNANCY TEST URINE: NEGATIVE
EXT. CONTROL: NORMAL
RH BLD: POSITIVE

## 2025-06-25 PROCEDURE — 58571 TLH W/T/O 250 G OR LESS: CPT | Performed by: OBSTETRICS & GYNECOLOGY

## 2025-06-25 PROCEDURE — 88307 TISSUE EXAM BY PATHOLOGIST: CPT | Performed by: PATHOLOGY

## 2025-06-25 PROCEDURE — NC001 PR NO CHARGE: Performed by: OBSTETRICS & GYNECOLOGY

## 2025-06-25 PROCEDURE — 81025 URINE PREGNANCY TEST: CPT | Performed by: OBSTETRICS & GYNECOLOGY

## 2025-06-25 RX ORDER — OXYCODONE HYDROCHLORIDE 5 MG/1
5 TABLET ORAL EVERY 4 HOURS PRN
Status: DISCONTINUED | OUTPATIENT
Start: 2025-06-25 | End: 2025-06-25 | Stop reason: HOSPADM

## 2025-06-25 RX ORDER — FENTANYL CITRATE 50 UG/ML
INJECTION, SOLUTION INTRAMUSCULAR; INTRAVENOUS AS NEEDED
Status: DISCONTINUED | OUTPATIENT
Start: 2025-06-25 | End: 2025-06-25

## 2025-06-25 RX ORDER — ACETAMINOPHEN 500 MG
1000 TABLET ORAL EVERY 8 HOURS PRN
Start: 2025-06-25

## 2025-06-25 RX ORDER — ROCURONIUM BROMIDE 10 MG/ML
INJECTION, SOLUTION INTRAVENOUS AS NEEDED
Status: DISCONTINUED | OUTPATIENT
Start: 2025-06-25 | End: 2025-06-25

## 2025-06-25 RX ORDER — GLYCOPYRROLATE 0.2 MG/ML
INJECTION INTRAMUSCULAR; INTRAVENOUS AS NEEDED
Status: DISCONTINUED | OUTPATIENT
Start: 2025-06-25 | End: 2025-06-25

## 2025-06-25 RX ORDER — ONDANSETRON 2 MG/ML
4 INJECTION INTRAMUSCULAR; INTRAVENOUS EVERY 6 HOURS PRN
Status: DISCONTINUED | OUTPATIENT
Start: 2025-06-25 | End: 2025-06-25 | Stop reason: HOSPADM

## 2025-06-25 RX ORDER — NEOSTIGMINE METHYLSULFATE 1 MG/ML
INJECTION INTRAVENOUS AS NEEDED
Status: DISCONTINUED | OUTPATIENT
Start: 2025-06-25 | End: 2025-06-25

## 2025-06-25 RX ORDER — EPHEDRINE SULFATE 50 MG/ML
INJECTION INTRAVENOUS AS NEEDED
Status: DISCONTINUED | OUTPATIENT
Start: 2025-06-25 | End: 2025-06-25

## 2025-06-25 RX ORDER — SCOPOLAMINE 1 MG/3D
1 PATCH, EXTENDED RELEASE TRANSDERMAL
Status: DISCONTINUED | OUTPATIENT
Start: 2025-06-25 | End: 2025-06-25 | Stop reason: HOSPADM

## 2025-06-25 RX ORDER — LIDOCAINE HYDROCHLORIDE 20 MG/ML
INJECTION, SOLUTION EPIDURAL; INFILTRATION; INTRACAUDAL; PERINEURAL AS NEEDED
Status: DISCONTINUED | OUTPATIENT
Start: 2025-06-25 | End: 2025-06-25

## 2025-06-25 RX ORDER — OXYCODONE HYDROCHLORIDE 5 MG/1
5 TABLET ORAL EVERY 6 HOURS PRN
Qty: 15 TABLET | Refills: 0 | Status: SHIPPED | OUTPATIENT
Start: 2025-06-25

## 2025-06-25 RX ORDER — KETOROLAC TROMETHAMINE 30 MG/ML
INJECTION, SOLUTION INTRAMUSCULAR; INTRAVENOUS AS NEEDED
Status: DISCONTINUED | OUTPATIENT
Start: 2025-06-25 | End: 2025-06-25

## 2025-06-25 RX ORDER — PROPOFOL 10 MG/ML
INJECTION, EMULSION INTRAVENOUS CONTINUOUS PRN
Status: DISCONTINUED | OUTPATIENT
Start: 2025-06-25 | End: 2025-06-25

## 2025-06-25 RX ORDER — BUPIVACAINE HYDROCHLORIDE 5 MG/ML
INJECTION, SOLUTION EPIDURAL; INTRACAUDAL; PERINEURAL AS NEEDED
Status: DISCONTINUED | OUTPATIENT
Start: 2025-06-25 | End: 2025-06-25 | Stop reason: HOSPADM

## 2025-06-25 RX ORDER — ACETAMINOPHEN 325 MG/1
975 TABLET ORAL EVERY 6 HOURS PRN
Status: DISCONTINUED | OUTPATIENT
Start: 2025-06-25 | End: 2025-06-25 | Stop reason: HOSPADM

## 2025-06-25 RX ORDER — ACETAMINOPHEN 10 MG/ML
1000 INJECTION, SOLUTION INTRAVENOUS ONCE
Status: DISCONTINUED | OUTPATIENT
Start: 2025-06-25 | End: 2025-06-25 | Stop reason: HOSPADM

## 2025-06-25 RX ORDER — SODIUM CHLORIDE, SODIUM LACTATE, POTASSIUM CHLORIDE, CALCIUM CHLORIDE 600; 310; 30; 20 MG/100ML; MG/100ML; MG/100ML; MG/100ML
125 INJECTION, SOLUTION INTRAVENOUS CONTINUOUS
Status: DISCONTINUED | OUTPATIENT
Start: 2025-06-25 | End: 2025-06-25 | Stop reason: HOSPADM

## 2025-06-25 RX ORDER — ONDANSETRON 2 MG/ML
4 INJECTION INTRAMUSCULAR; INTRAVENOUS ONCE AS NEEDED
Status: DISCONTINUED | OUTPATIENT
Start: 2025-06-25 | End: 2025-06-25 | Stop reason: HOSPADM

## 2025-06-25 RX ORDER — IBUPROFEN 600 MG/1
600 TABLET, FILM COATED ORAL EVERY 6 HOURS PRN
Status: DISCONTINUED | OUTPATIENT
Start: 2025-06-25 | End: 2025-06-25 | Stop reason: HOSPADM

## 2025-06-25 RX ORDER — OXYCODONE HYDROCHLORIDE 10 MG/1
10 TABLET ORAL EVERY 4 HOURS PRN
Status: DISCONTINUED | OUTPATIENT
Start: 2025-06-25 | End: 2025-06-25 | Stop reason: HOSPADM

## 2025-06-25 RX ORDER — HYDROMORPHONE HCL/PF 1 MG/ML
0.5 SYRINGE (ML) INJECTION
Status: DISCONTINUED | OUTPATIENT
Start: 2025-06-25 | End: 2025-06-25 | Stop reason: HOSPADM

## 2025-06-25 RX ORDER — SODIUM CHLORIDE 9 MG/ML
INJECTION, SOLUTION INTRAVENOUS AS NEEDED
Status: DISCONTINUED | OUTPATIENT
Start: 2025-06-25 | End: 2025-06-25 | Stop reason: HOSPADM

## 2025-06-25 RX ORDER — CEFAZOLIN SODIUM 2 G/50ML
2000 SOLUTION INTRAVENOUS ONCE
Status: COMPLETED | OUTPATIENT
Start: 2025-06-25 | End: 2025-06-25

## 2025-06-25 RX ORDER — FENTANYL CITRATE/PF 50 MCG/ML
25 SYRINGE (ML) INJECTION
Status: DISCONTINUED | OUTPATIENT
Start: 2025-06-25 | End: 2025-06-25 | Stop reason: HOSPADM

## 2025-06-25 RX ORDER — MIDAZOLAM HYDROCHLORIDE 2 MG/2ML
INJECTION, SOLUTION INTRAMUSCULAR; INTRAVENOUS AS NEEDED
Status: DISCONTINUED | OUTPATIENT
Start: 2025-06-25 | End: 2025-06-25

## 2025-06-25 RX ORDER — HYDROMORPHONE HCL/PF 1 MG/ML
SYRINGE (ML) INJECTION AS NEEDED
Status: DISCONTINUED | OUTPATIENT
Start: 2025-06-25 | End: 2025-06-25

## 2025-06-25 RX ORDER — DEXAMETHASONE SODIUM PHOSPHATE 10 MG/ML
INJECTION, SOLUTION INTRAMUSCULAR; INTRAVENOUS AS NEEDED
Status: DISCONTINUED | OUTPATIENT
Start: 2025-06-25 | End: 2025-06-25

## 2025-06-25 RX ORDER — ONDANSETRON 2 MG/ML
INJECTION INTRAMUSCULAR; INTRAVENOUS AS NEEDED
Status: DISCONTINUED | OUTPATIENT
Start: 2025-06-25 | End: 2025-06-25

## 2025-06-25 RX ORDER — ACETAMINOPHEN 325 MG/1
975 TABLET ORAL ONCE
Status: COMPLETED | OUTPATIENT
Start: 2025-06-25 | End: 2025-06-25

## 2025-06-25 RX ORDER — NAPROXEN 500 MG/1
500 TABLET ORAL 2 TIMES DAILY PRN
Qty: 50 TABLET | Refills: 1 | Status: SHIPPED | OUTPATIENT
Start: 2025-06-25

## 2025-06-25 RX ORDER — LORAZEPAM 0.5 MG/1
1 TABLET ORAL ONCE AS NEEDED
Status: COMPLETED | OUTPATIENT
Start: 2025-06-25 | End: 2025-06-25

## 2025-06-25 RX ADMIN — DEXAMETHASONE SODIUM PHOSPHATE 10 MG: 10 INJECTION, SOLUTION INTRAMUSCULAR; INTRAVENOUS at 12:46

## 2025-06-25 RX ADMIN — EPHEDRINE SULFATE 15 MG: 50 INJECTION INTRAVENOUS at 12:57

## 2025-06-25 RX ADMIN — DEXMEDETOMIDINE 12 MCG: 100 INJECTION, SOLUTION, CONCENTRATE INTRAVENOUS at 12:39

## 2025-06-25 RX ADMIN — SODIUM CHLORIDE, SODIUM LACTATE, POTASSIUM CHLORIDE, AND CALCIUM CHLORIDE: .6; .31; .03; .02 INJECTION, SOLUTION INTRAVENOUS at 14:20

## 2025-06-25 RX ADMIN — ONDANSETRON 4 MG: 2 INJECTION INTRAMUSCULAR; INTRAVENOUS at 14:12

## 2025-06-25 RX ADMIN — PROPOFOL 130 MCG/KG/MIN: 10 INJECTION, EMULSION INTRAVENOUS at 12:52

## 2025-06-25 RX ADMIN — ROCURONIUM 60 MG: 50 INJECTION, SOLUTION INTRAVENOUS at 12:47

## 2025-06-25 RX ADMIN — GLYCOPYRROLATE 0.4 MG: 0.2 INJECTION, SOLUTION INTRAMUSCULAR; INTRAVENOUS at 14:17

## 2025-06-25 RX ADMIN — CEFAZOLIN SODIUM 2000 MG: 2 SOLUTION INTRAVENOUS at 12:43

## 2025-06-25 RX ADMIN — FENTANYL CITRATE 50 MCG: 50 INJECTION INTRAMUSCULAR; INTRAVENOUS at 14:30

## 2025-06-25 RX ADMIN — EPHEDRINE SULFATE 5 MG: 50 INJECTION INTRAVENOUS at 12:59

## 2025-06-25 RX ADMIN — ACETAMINOPHEN 975 MG: 325 TABLET ORAL at 10:51

## 2025-06-25 RX ADMIN — PROPOFOL 150 MG: 10 INJECTION, EMULSION INTRAVENOUS at 12:46

## 2025-06-25 RX ADMIN — LIDOCAINE HYDROCHLORIDE 80 MG: 20 INJECTION, SOLUTION EPIDURAL; INFILTRATION; INTRACAUDAL at 12:46

## 2025-06-25 RX ADMIN — FENTANYL CITRATE 50 MCG: 50 INJECTION INTRAMUSCULAR; INTRAVENOUS at 12:44

## 2025-06-25 RX ADMIN — SODIUM CHLORIDE, SODIUM LACTATE, POTASSIUM CHLORIDE, AND CALCIUM CHLORIDE 125 ML/HR: .6; .31; .03; .02 INJECTION, SOLUTION INTRAVENOUS at 10:55

## 2025-06-25 RX ADMIN — KETOROLAC TROMETHAMINE 15 MG: 30 INJECTION, SOLUTION INTRAMUSCULAR; INTRAVENOUS at 14:16

## 2025-06-25 RX ADMIN — HYDROMORPHONE HYDROCHLORIDE 0.5 MG: 1 INJECTION, SOLUTION INTRAMUSCULAR; INTRAVENOUS; SUBCUTANEOUS at 13:14

## 2025-06-25 RX ADMIN — NEOSTIGMINE METHYLSULFATE 3 MG: 1 INJECTION INTRAVENOUS at 14:17

## 2025-06-25 RX ADMIN — HYDROMORPHONE HYDROCHLORIDE 0.5 MG: 1 INJECTION, SOLUTION INTRAMUSCULAR; INTRAVENOUS; SUBCUTANEOUS at 13:08

## 2025-06-25 RX ADMIN — SCOPOLAMINE 1 PATCH: 1.5 PATCH, EXTENDED RELEASE TRANSDERMAL at 10:51

## 2025-06-25 RX ADMIN — MIDAZOLAM HYDROCHLORIDE 2 MG: 1 INJECTION, SOLUTION INTRAMUSCULAR; INTRAVENOUS at 12:41

## 2025-06-25 RX ADMIN — LORAZEPAM 1 MG: 0.5 TABLET ORAL at 10:51

## 2025-06-25 RX ADMIN — DEXMEDETOMIDINE 8 MCG: 100 INJECTION, SOLUTION, CONCENTRATE INTRAVENOUS at 12:46

## 2025-06-25 NOTE — OP NOTE
OPERATIVE REPORT  PATIENT NAME: Gloria Marquez    :  1995  MRN: [id number]  Pt Location: [facility] OR ROOM 02    SURGERY DATE: 2025    Surgeons and Role:     * Marlee Ascencio MD - Primary     * Vinay Peoples MD - Assisting    Preop Diagnosis:  Dysmenorrhea [N94.6]    Post-Op Diagnosis Codes:     * Dysmenorrhea [N94.6]    Procedure(s):  HYSTERECTOMY LAPAROSCOPIC TOTAL  WITH   BILATERAL SALPINGECTOMY. CYSTOSCOPY. EXAM UNDER ANESTHESIA    Specimen(s):  ID Type Source Tests Collected by Time Destination   1 : Uterus, cervix, B/L fallopian tubes Tissue Uterus TISSUE EXAM Marlee Ascencio MD 2025 [id number]        Estimated Blood Loss:   100 mL    Drains:  [REMOVED] Urethral Catheter Non-latex 16 Fr. (Removed)   Number of days: 0       Anesthesia Type:   General    Operative Indications:  Dysmenorrhea [N94.6]    Operative Findings:  Grossly normal appearing external female genitalia  Cervix with no lesions, moderate descensus  Grossly normal appearing uterus, bilateral tubes, and ovaries  Simple-appearing cyst on left ovary, 1 x 2 cm     Complications:   None apparent    Procedure and Technique:  The patient was correctly identified in preop holding. She was taken to the operating room. SCDs were applied to the lower extremities. General anesthesia was administered without difficulty. Ancef was administered for surgical prophylaxis. She was then positioned in dorsal lithotomy using yellowfin stirrups with the arms tucked. All pressure points were padded. Exam under anesthesia was performed. The vagina was prepped with chlorhexidine. The abdomen was prepped with Chloraprep and appropriate drying time allowed before sterile drapes were applied. Time-out was performed with all parties in agreement.    The anterior lip of the cervix was visualized and grasped with a single-tooth tenaculum. The uterus sounded to 6 cm. The cervix was serially dilated to accommodate the RYAN uterine manipulator tip. A stay  suture of 0-Vicryl was applied to the anterior lip of the cervix.  The single-tooth tenaculum was removed and the RYAN uterine manipulator placed without incident. A morin catheter was inserted into the bladder and the vaginal occluder balloon inflated.    Sterile gloves were exchanged and attention turned to the abdomen.  Local anesthesia was infiltrated inferior to the umbilicus. A 10 mm incision was made for introduction of a 10 mm trocar. The laparoscope was inserted under direct visualization at this site. Pneumoperitoneum was established and maintained at 15mmHg. There was no evidence of injury directly below the trocar insertion site.    Subsequently, after infiltrating the areas with local, two additional small incisions were made in the right and left lower quadrants. Two 5 mm ports were introduced under direct visualization at these sites. The patient was placed in trendelenburg. Findings of pelvic survey are as noted above. The  right fallopian tube was grasped and elevated by its fimbriated end. The transperitoneal course of the  ureter was identified to be well out of the planned surgical field. The Ligasure was selected. The mesosalpinx of the right fallopian tube was coagulated and cut and the fallopian tube amputated 2 cm from the cornua. The fallopian tube was withdrawn from the abdomen. The right round and right utero-ovarian ligaments were sequentially coagulated and cut. The broad ligament was then coagulated, cut and its anterior and posterior leaves  to initiate the bladder flap. The bladder was dissected approximately CHCF across the lower uterine segment.     Attention was turned to the contralateral side. The transperitoneal course of the  ureter was identified to be well out of the planned surgical field. The same sequence of procedures was then performed on this side. The bladder flap was completed and the bladder pushed down to below the level of the Koh cervical cup. The  uterine arteries were then coagulated and cut bilaterally. The bilateral uterosacral-cardinal ligament complexes were completely detached from the cervix. Blanching of the uterus was confirmed. The colpotomy was performed with the monopolar L-hook with the Ligasure used laterally near the vascular pedicles to ensure hemostasis. Once the colpotomy was completed, the attention was then turned to the perineum where the vaginal cuff occluder was deflated and the specimens removed vaginally. All specimens (uterus, cervix, bilateral tubes) were sent for pathological analysis. A sterile glove with a lap sponge was placed in the vagina to reestablish pneumoperitoneum.     Attention was then turned to the abdomen.  The vaginal cuff was closed laparoscopically with running 2-0 PDS Stratafix suture. The suture was cut, and the needle and excess suture removed from the abdomen. Good hemostasis was achieved.    The pelvis was irrigated and suctioned. Hemostasis was confirmed at all surgical sites under reduced  intraabdominal pressure. Pneumoperitoneum was then evacuated and the trocars and laparoscope were removed. The trocar incisions were closed using 4-0 Monocryl  and exofin. The morin was removed from the bladder and cystoscopy was performed. The bladder was confirmed to be intact with no evidence of intravesical suture material. Bilateral ureteral jets were visualized. The cystoscope was withdrawn and the bladder was drained via the morin. There were no lacerations to the vagina. Patient was  extubated and transferred to the recovery room in stable condition. She tolerated the procedure well. All needle, sponge, and instrument counts were noted to be correct x 2 at the end of the procedure.     Dr. Ascencio was present for the entire procedure.      Patient Disposition:  PACU        SIGNATURE: Vinay Peoples MD  DATE: June 25, 2025  TIME: 2:49 PM

## 2025-06-25 NOTE — H&P
History & Physical - OB/GYN   Gloria Marquez 29 y.o. female MRN: [id number]  Unit/Bed#: OR POOL Encounter: [id number]      Chief complaint:  preop    HPI:  Ms Marquez is a 29 y.o.  presenting for planned TLH, BS, cysto for dysmenorrhea. She affirms today confidence in plan to proceed and understanding that this will preclude natural childbearing. She has no acute complaints today. Denies fever/chills, cp, sob, n/v/d.       Active Problems:  Problem List[1]    PMH:  Past Medical History[2]    PSH:  Past Surgical History[3]    Social Hx:  Social History[4]    OB Hx:  OB History    Para Term  AB Living   0 0 0 0 0 0   SAB IAB Ectopic Multiple Live Births   0 0 0 0 0       Meds:  Medications Ordered Prior to Encounter[5]    Allergies:  Allergies[6]      Physical Exam:  /62   Pulse 99   Temp 98.2 °F (36.8 °C) (Temporal)   Resp 16   Wt 77.3 kg (170 lb 6.7 oz)   SpO2 100%   BMI 23.77 kg/m²     Physical Exam  Constitutional:       General: She is not in acute distress.     Appearance: Normal appearance. She is not ill-appearing, toxic-appearing or diaphoretic.     Eyes:      General: No scleral icterus.        Right eye: No discharge.         Left eye: No discharge.      Conjunctiva/sclera: Conjunctivae normal.       Cardiovascular:      Rate and Rhythm: Normal rate and regular rhythm.      Heart sounds: Normal heart sounds. No murmur heard.     No friction rub.   Pulmonary:      Effort: Pulmonary effort is normal. No respiratory distress.      Breath sounds: Normal breath sounds. No wheezing or rales.   Abdominal:      General: There is no distension.      Palpations: There is no mass.      Tenderness: There is no abdominal tenderness. There is no guarding or rebound.      Hernia: No hernia is present.     Musculoskeletal:         General: No swelling.     Skin:     General: Skin is warm and dry.      Coloration: Skin is not jaundiced or pale.      Findings: No bruising or erythema.      Neurological:      Mental Status: She is alert.     Psychiatric:         Mood and Affect: Mood normal.         Behavior: Behavior normal.         Thought Content: Thought content normal.         Judgment: Judgment normal.           Assessment:   29 y.o.  presenting for planned TLH, BS, cysto for dysmenorrhea.    Plan:   1. To OR as planned         [1]   Patient Active Problem List  Diagnosis    Migraine    Dysmenorrhea    Request for sterilization   [2]   Past Medical History:  Diagnosis Date    Acne     Since my young teens    Bunion teens    Irritable bowel syndrome     Migraine summer 2021   [3]   Past Surgical History:  Procedure Laterality Date    FRACTURE SURGERY Right     Ankle    TONSILLECTOMY     [4]   Social History  Tobacco Use    Smoking status: Never     Passive exposure: Never    Smokeless tobacco: Never   Vaping Use    Vaping status: Never Used   Substance Use Topics    Alcohol use: Never    Drug use: Never   [5]   No current facility-administered medications on file prior to encounter.     Current Outpatient Medications on File Prior to Encounter   Medication Sig Dispense Refill    atenolol (TENORMIN) 25 mg tablet Take 1 tablet (25 mg total) by mouth daily 90 tablet 1    cholecalciferol (VITAMIN D3) 400 units tablet Take 400 Units by mouth in the morning.      Multiple Vitamin (multivitamin) tablet Take 1 tablet by mouth in the morning.      rizatriptan (MAXALT) 5 mg tablet Take 1 tablet (5 mg total) by mouth as needed for migraine may repeat in 2 hours if necessary 10 tablet 5    Levonorgestrel (MIRENA) 20 MCG/DAY IUD 1 each by Intrauterine Device route Once every 8 years      naproxen (NAPROSYN) 500 mg tablet Take 1 tablet (500 mg total) by mouth 2 (two) times a day as needed for headaches (with meals) 30 tablet 1   [6] No Known Allergies

## 2025-06-25 NOTE — ANESTHESIA POSTPROCEDURE EVALUATION
Post-Op Assessment Note    CV Status:  Stable    Pain management: adequate       Mental Status:  Awake   Hydration Status:  Stable   PONV Controlled:  Controlled   Airway Patency:  Patent     Post Op Vitals Reviewed: Yes    No anethesia notable event occurred.    Staff: Anesthesiologist           Last Filed PACU Vitals:  Vitals Value Taken Time   Temp 98.9 °F (37.2 °C) 06/25/25 14:33   Pulse 56 06/25/25 15:21   /59 06/25/25 15:15   Resp 16 06/25/25 15:15   SpO2 100 % 06/25/25 15:21   Vitals shown include unfiled device data.    Modified Jaun:     Vitals Value Taken Time   Activity 2 06/25/25 15:15   Respiration 2 06/25/25 15:15   Circulation 2 06/25/25 15:15   Consciousness 2 06/25/25 15:15   Oxygen Saturation 2 06/25/25 15:15     Modified Jaun Score: 10

## 2025-06-25 NOTE — ANESTHESIA PREPROCEDURE EVALUATION
Procedure:  HYSTERECTOMY LAPAROSCOPIC TOTAL  WITH   BILATERAL SALPINGECTOMY, CYSTOSCOPY, EXAM UNDER ANESTHESIA (Abdomen)    Relevant Problems   CARDIO   (+) Migraine      NEURO/PSYCH   (+) Migraine      Obstetrics/Gynecology   (+) Dysmenorrhea      Other   (+) Request for sterilization        Physical Exam    Airway     Mallampati score: I  TM Distance: <3 FB    Mouth opening: >= 4 cm      Cardiovascular  Rhythm: regular, Rate: normal    Dental   No notable dental hx     Pulmonary   Breath sounds clear to auscultation    Neurological    She appears awake, alert and oriented x3.      Other Findings        Anesthesia Plan  ASA Score- 1     Anesthesia Type- general with ASA Monitors.         Additional Monitors:     Airway Plan: Oral ETT.    Comment: 2nd PIV, confrimed transfusion OK if needed, TAPS if open.       Plan Factors-Exercise tolerance (METS): >4 METS.    Chart reviewed.   Existing labs reviewed.     Patient is not a current smoker.      Obstructive sleep apnea risk education given perioperatively.        Induction- intravenous.    Postoperative Plan- Plan for postoperative opioid use.   Monitoring Plan - Monitoring plan - standard ASA monitoring  Post Operative Pain Plan - non-opiod analgesics, plan for postoperative opioid use and multimodal analgesia        Informed Consent- Anesthetic plan and risks discussed with patient.        NPO Status:  No vitals data found for the desired time range.

## 2025-06-25 NOTE — ANESTHESIA POSTPROCEDURE EVALUATION
Post-Op Assessment Note    CV Status:  Stable  Pain Score: 0    Pain management: adequate       Mental Status:  Sleepy and arousable   Hydration Status:  Euvolemic   PONV Controlled:  Controlled   Airway Patency:  Patent     Post Op Vitals Reviewed: Yes    No anethesia notable event occurred.    Staff: Anesthesiologist, CRNA   Comments: Report given to recovering RN, VSS, Pt resting comfortably.          Last Filed PACU Vitals:  Vitals Value Taken Time   Temp 98.9 °F (37.2 °C) 06/25/25 14:33   Pulse 66 06/25/25 14:40   BP 90/55 06/25/25 14:34   Resp 16 06/25/25 14:33   SpO2 100 % 06/25/25 14:40   Vitals shown include unfiled device data.    Modified Jaun:     Vitals Value Taken Time   Activity 0 06/25/25 14:33   Respiration 2 06/25/25 14:33   Circulation 2 06/25/25 14:33   Consciousness 0 06/25/25 14:33   Oxygen Saturation 1 06/25/25 14:33     Modified Jaun Score: 5

## 2025-07-02 ENCOUNTER — CLINICAL SUPPORT (OUTPATIENT)
Dept: OBGYN CLINIC | Facility: MEDICAL CENTER | Age: 30
End: 2025-07-02
Payer: COMMERCIAL

## 2025-07-02 ENCOUNTER — TELEPHONE (OUTPATIENT)
Age: 30
End: 2025-07-02

## 2025-07-02 DIAGNOSIS — Z23 NEED FOR HPV VACCINATION: Primary | ICD-10-CM

## 2025-07-02 PROCEDURE — 90471 IMMUNIZATION ADMIN: CPT

## 2025-07-02 PROCEDURE — 90651 9VHPV VACCINE 2/3 DOSE IM: CPT

## 2025-07-02 NOTE — PROGRESS NOTES
Pt presented for 3rd HPV Vaccine   Given IM Right Deltoid  Pt tolerated well    NDC: 5440-5113-53  LOT: [id number]  EXP: 02-

## 2025-07-02 NOTE — TELEPHONE ENCOUNTER
Patient calling in to have paperwork filled out below ASAP. Please see huma msg. Patient said she can come tmrw 7/3/35 to pickup.  Thank You

## 2025-07-03 ENCOUNTER — TELEPHONE (OUTPATIENT)
Age: 30
End: 2025-07-03

## 2025-07-03 NOTE — TELEPHONE ENCOUNTER
Patient sent a my chart message and she called and asked if her work [facility] paperwork can be faxed by [email or web address]  for her la to kick in .  She asked if this can please be sent to her my chart and patient will forward it to her employer.  . She believes it is 4 pages.  Patient asked if she can have it also sent to her in my chart for her to have a copy of it .  Thank you

## 2025-07-09 ENCOUNTER — TELEPHONE (OUTPATIENT)
Dept: OBGYN CLINIC | Facility: MEDICAL CENTER | Age: 30
End: 2025-07-09

## 2025-07-11 ENCOUNTER — OFFICE VISIT (OUTPATIENT)
Dept: OBGYN CLINIC | Facility: CLINIC | Age: 30
End: 2025-07-11

## 2025-07-11 VITALS
BODY MASS INDEX: 23.77 KG/M2 | HEIGHT: 71 IN | DIASTOLIC BLOOD PRESSURE: 72 MMHG | WEIGHT: 169.8 LBS | SYSTOLIC BLOOD PRESSURE: 102 MMHG

## 2025-07-11 DIAGNOSIS — Z09 POSTOPERATIVE EXAMINATION: Primary | ICD-10-CM

## 2025-07-11 DIAGNOSIS — Z90.710 S/P LAPAROSCOPIC HYSTERECTOMY: ICD-10-CM

## 2025-07-11 PROCEDURE — 99024 POSTOP FOLLOW-UP VISIT: CPT | Performed by: OBSTETRICS & GYNECOLOGY

## 2025-08-13 ENCOUNTER — TELEPHONE (OUTPATIENT)
Dept: OBGYN CLINIC | Facility: MEDICAL CENTER | Age: 30
End: 2025-08-13

## (undated) DEVICE — 40595 XL TRENDELENBURG POSITIONING KIT: Brand: 40595 XL TRENDELENBURG POSITIONING KIT

## (undated) DEVICE — SCD SEQUENTIAL COMPRESSION COMFORT SLEEVE MEDIUM KNEE LENGTH: Brand: KENDALL SCD

## (undated) DEVICE — LAPAROSCOPIC WIRE L-HOOK ELECTRODE COATED: Brand: CLEANCOAT

## (undated) DEVICE — PENCILETTE PUSH BUTTON COATED

## (undated) DEVICE — TROCAR: Brand: KII SLEEVE

## (undated) DEVICE — UTERINE MANIPULATOR RUMI 5.1 X 6 CM

## (undated) DEVICE — TRAY FOLEY 16FR URIMETER SILICONE SURESTEP

## (undated) DEVICE — TROCAR: Brand: KII FIOS FIRST ENTRY

## (undated) DEVICE — ENDOPATH 5MM ENDOSCOPIC BLUNT TIP DISSECTORS (12 POUCHES CONTAINING 3 DISSECTORS EACH): Brand: ENDOPATH

## (undated) DEVICE — LAPAROSCOPIC DISSECTOR: Brand: DEROYAL

## (undated) DEVICE — BETHLEHEM UNIVERSAL GYN LAP PK: Brand: CARDINAL HEALTH

## (undated) DEVICE — DRAPE EQUIPMENT RF WAND

## (undated) DEVICE — MAYO STAND COVER: Brand: CONVERTORS

## (undated) DEVICE — 4-PORT MANIFOLD: Brand: NEPTUNE 2

## (undated) DEVICE — BLUE HEAT SCOPE WARMER

## (undated) DEVICE — OCCLUDER COLPO-PNEUMO

## (undated) DEVICE — GLOVE PI ULTRA TOUCH SZ.7.0

## (undated) DEVICE — [HIGH FLOW INSUFFLATOR,  DO NOT USE IF PACKAGE IS DAMAGED,  KEEP DRY,  KEEP AWAY FROM SUNLIGHT,  PROTECT FROM HEAT AND RADIOACTIVE SOURCES.]: Brand: PNEUMOSURE

## (undated) DEVICE — MARYLAND JAW LAPAROSCOPIC SEALER/DIVIDER COATED: Brand: LIGASURE

## (undated) DEVICE — EXOFIN PRECISION PEN HIGH VISCOSITY TOPICAL SKIN ADHESIVE: Brand: EXOFIN PRECISION PEN, 1G

## (undated) DEVICE — IV SET EXT 30 IN

## (undated) DEVICE — SUT STRATAFIX SPIRAL PDS PLUS 2-0 CT-1 9IN SXPP1B456

## (undated) DEVICE — PREMIUM DRY TRAY LF: Brand: MEDLINE INDUSTRIES, INC.

## (undated) DEVICE — SYRINGE 50ML LL

## (undated) DEVICE — GLOVE INDICATOR PI UNDERGLOVE SZ 7 BLUE

## (undated) DEVICE — TUBING SMOKE EVAC W/FILTRATION DEVICE PLUMEPORT ACTIV

## (undated) DEVICE — SUT MONOCRYL 4-0 PS-2 27 IN Y426H

## (undated) DEVICE — 2, DISPOSABLE SUCTION/IRRIGATOR WITHOUT DISPOSABLE TIP: Brand: STRYKEFLOW

## (undated) DEVICE — REM POLYHESIVE ADULT PATIENT RETURN ELECTRODE: Brand: VALLEYLAB

## (undated) DEVICE — CHLORAPREP HI-LITE 26ML ORANGE

## (undated) DEVICE — EXIDINE 4 PCT

## (undated) DEVICE — SUT VICRYL 0 CT-1 36 IN J946H